# Patient Record
Sex: MALE | Race: WHITE | NOT HISPANIC OR LATINO | Employment: UNEMPLOYED | URBAN - METROPOLITAN AREA
[De-identification: names, ages, dates, MRNs, and addresses within clinical notes are randomized per-mention and may not be internally consistent; named-entity substitution may affect disease eponyms.]

---

## 2019-06-25 ENCOUNTER — TELEPHONE (OUTPATIENT)
Dept: FAMILY MEDICINE | Facility: OTHER | Age: 44
End: 2019-06-25

## 2019-06-25 NOTE — TELEPHONE ENCOUNTER
7:43 AM    Reason for Call: OVERBOOK    Patient is having the following symptoms: URI  for  1 week    The patient is requesting an appointment for Friday  with Mustapha Celeste    Was an appointment offered for this call?  No    Preferred method for responding to this message: [phone]   Wife    If we cannot reach you directly, may we leave a detailed response at the number you provided? Yes        Latia Reese

## 2019-06-27 NOTE — PROGRESS NOTES
"Subjective     Nehemias Miller is a 43 year old male who presents to clinic today for the following health issues:    HPI   Acute Illness   Acute illness concerns: uri  Onset: 2 weeks    Fever: no    Chills/Sweats: YES    Headache (location?): no     Sinus Pressure:YES    Conjunctivitis:  YES-     Ear Pain: no    Rhinorrhea: YES    Congestion: YES    Sore Throat: YES     Cough: YES    Wheeze: YES    Decreased Appetite: YES    Nausea: no     Vomiting: no     Diarrhea:  no     Dysuria/Freq.: no     Fatigue/Achiness: YES    Sick/Strep Exposure: YES- wife     Therapies Tried and outcome: none        There is no problem list on file for this patient.    No past surgical history on file.    Social History     Tobacco Use     Smoking status: Former Smoker     Smokeless tobacco: Current User   Substance Use Topics     Alcohol use: Yes     Comment: occ     No family history on file.      No current outpatient medications on file.     No Known Allergies  Recent Labs   Lab Test 11/24/16  1732   ALT 21   CR 1.26*   GFRESTIMATED 64   GFRESTBLACK 77   POTASSIUM 3.8      BP Readings from Last 3 Encounters:   06/28/19 114/64   11/24/16 (!) 141/108   09/28/16 140/87    Wt Readings from Last 3 Encounters:   06/28/19 78.9 kg (174 lb)   10/01/15 81.6 kg (180 lb)                 Reviewed and updated as needed this visit by Provider         Review of Systems   ROS COMP: Constitutional, HEENT, cardiovascular, pulmonary, gi and gu systems are negative, except as otherwise noted.      Objective    /64 (BP Location: Right arm, Patient Position: Sitting, Cuff Size: Adult Large)   Pulse 78   Temp 98.6  F (37  C) (Tympanic)   Resp 14   Ht 1.778 m (5' 10\")   Wt 78.9 kg (174 lb)   SpO2 93%   BMI 24.97 kg/m    Body mass index is 24.97 kg/m .  Physical Exam   GENERAL: healthy, alert and no distress  HENT: normal cephalic/atraumatic, both ears: dull, nose and mouth without ulcers or lesions, nasal mucosa edematous , rhinorrhea yellow and " "thick, oral mucous membranes moist, throat erythematous and sinuses: maxillary and frontal are tender.    NECK: no adenopathy, no asymmetry, masses, or scars and thyroid normal to palpation  RESP: lungs clear to auscultation - no rales, rhonchi or wheezes  CV: regular rate and rhythm, normal S1 S2, no S3 or S4, no murmur, click or rub, no peripheral edema and peripheral pulses strong  MS: no gross musculoskeletal defects noted, no edema  PSYCH: mentation appears normal, affect normal/bright            Assessment & Plan     1. Acute sinusitis with symptoms greater than 10 days  - amoxicillin-clavulanate (AUGMENTIN) 875-125 MG tablet; Take 1 tablet by mouth 2 times daily for 14 days  Dispense: 28 tablet; Refill: 0  - triamcinolone (NASACORT) 55 MCG/ACT nasal aerosol; Spray 2 sprays into both nostrils daily  Dispense: 1 Bottle; Refill: 0     BMI:   Estimated body mass index is 24.97 kg/m  as calculated from the following:    Height as of this encounter: 1.778 m (5' 10\").    Weight as of this encounter: 78.9 kg (174 lb).         FUTURE APPOINTMENTS:       - Follow-up visit if no improvement or any worsening       Mary Leslie NP  [facility]      "

## 2019-06-28 ENCOUNTER — OFFICE VISIT (OUTPATIENT)
Dept: FAMILY MEDICINE | Facility: OTHER | Age: 44
End: 2019-06-28
Attending: NURSE PRACTITIONER
Payer: COMMERCIAL

## 2019-06-28 VITALS
DIASTOLIC BLOOD PRESSURE: 64 MMHG | RESPIRATION RATE: 14 BRPM | WEIGHT: 174 LBS | SYSTOLIC BLOOD PRESSURE: 114 MMHG | TEMPERATURE: 98.6 F | OXYGEN SATURATION: 93 % | HEIGHT: 70 IN | HEART RATE: 78 BPM | BODY MASS INDEX: 24.91 KG/M2

## 2019-06-28 DIAGNOSIS — J01.90 ACUTE SINUSITIS WITH SYMPTOMS GREATER THAN 10 DAYS: Primary | ICD-10-CM

## 2019-06-28 PROCEDURE — 99213 OFFICE O/P EST LOW 20 MIN: CPT | Performed by: NURSE PRACTITIONER

## 2019-06-28 RX ORDER — TRIAMCINOLONE ACETONIDE 55 UG/1
2 SPRAY, METERED NASAL DAILY
Qty: 1 BOTTLE | Refills: 0 | Status: SHIPPED | OUTPATIENT
Start: 2019-06-28 | End: 2019-12-16

## 2019-06-28 ASSESSMENT — MIFFLIN-ST. JEOR: SCORE: 1700.51

## 2019-06-28 ASSESSMENT — PAIN SCALES - GENERAL: PAINLEVEL: NO PAIN (0)

## 2019-06-28 NOTE — PATIENT INSTRUCTIONS
"  Assessment & Plan     1. Acute sinusitis with symptoms greater than 10 days  - amoxicillin-clavulanate (AUGMENTIN) 875-125 MG tablet; Take 1 tablet by mouth 2 times daily for 14 days  Dispense: 28 tablet; Refill: 0  - triamcinolone (NASACORT) 55 MCG/ACT nasal aerosol; Spray 2 sprays into both nostrils daily  Dispense: 1 Bottle; Refill: 0     BMI:   Estimated body mass index is 24.97 kg/m  as calculated from the following:    Height as of this encounter: 1.778 m (5' 10\").    Weight as of this encounter: 78.9 kg (174 lb).         FUTURE APPOINTMENTS:       - Follow-up visit if no improvement or any worsening       Mary Leslie NP  [facility]      "

## 2019-06-28 NOTE — NURSING NOTE
"Chief Complaint   Patient presents with     URI       Initial /64 (BP Location: Right arm, Patient Position: Sitting, Cuff Size: Adult Large)   Pulse 78   Temp 98.6  F (37  C) (Tympanic)   Resp 14   Ht 1.778 m (5' 10\")   Wt 78.9 kg (174 lb)   SpO2 93%   BMI 24.97 kg/m   Estimated body mass index is 24.97 kg/m  as calculated from the following:    Height as of this encounter: 1.778 m (5' 10\").    Weight as of this encounter: 78.9 kg (174 lb).  Medication Reconciliation: complete   Anabel Salazar      "

## 2019-12-12 ENCOUNTER — TRANSFERRED RECORDS (OUTPATIENT)
Dept: HEALTH INFORMATION MANAGEMENT | Facility: CLINIC | Age: 44
End: 2019-12-12

## 2019-12-16 ENCOUNTER — OFFICE VISIT (OUTPATIENT)
Dept: FAMILY MEDICINE | Facility: OTHER | Age: 44
End: 2019-12-16
Attending: NURSE PRACTITIONER
Payer: COMMERCIAL

## 2019-12-16 VITALS
OXYGEN SATURATION: 96 % | SYSTOLIC BLOOD PRESSURE: 114 MMHG | DIASTOLIC BLOOD PRESSURE: 68 MMHG | BODY MASS INDEX: 24.91 KG/M2 | WEIGHT: 174 LBS | HEART RATE: 86 BPM | HEIGHT: 70 IN | RESPIRATION RATE: 14 BRPM | TEMPERATURE: 97.9 F

## 2019-12-16 DIAGNOSIS — N45.1 EPIDIDYMITIS: ICD-10-CM

## 2019-12-16 DIAGNOSIS — R39.11 URINARY HESITANCY: Primary | ICD-10-CM

## 2019-12-16 PROBLEM — F90.9 ADHD (ATTENTION DEFICIT HYPERACTIVITY DISORDER): Status: ACTIVE | Noted: 2019-12-16

## 2019-12-16 PROBLEM — F17.200 TOBACCO DEPENDENCY: Status: ACTIVE | Noted: 2019-12-16

## 2019-12-16 PROBLEM — F41.0 PANIC ANXIETY SYNDROME: Status: ACTIVE | Noted: 2019-12-16

## 2019-12-16 PROCEDURE — 99213 OFFICE O/P EST LOW 20 MIN: CPT | Performed by: NURSE PRACTITIONER

## 2019-12-16 RX ORDER — NICOTINE 21 MG/24HR
PATCH, TRANSDERMAL 24 HOURS TRANSDERMAL
Refills: 0 | COMMUNITY
Start: 2019-12-12 | End: 2019-12-30

## 2019-12-16 RX ORDER — LEVOFLOXACIN 500 MG/1
TABLET, FILM COATED ORAL
Refills: 0 | COMMUNITY
Start: 2019-12-12 | End: 2019-12-30

## 2019-12-16 RX ORDER — MELATONIN/LEMON BALM LEAF EXTR 5MG-500MCG
TABLET ORAL
Refills: 0 | COMMUNITY
Start: 2019-12-12 | End: 2019-12-30

## 2019-12-16 ASSESSMENT — PAIN SCALES - GENERAL: PAINLEVEL: SEVERE PAIN (7)

## 2019-12-16 ASSESSMENT — MIFFLIN-ST. JEOR: SCORE: 1695.51

## 2019-12-16 NOTE — NURSING NOTE
"Chief Complaint   Patient presents with     ER F/U       Initial /68 (BP Location: Left arm, Patient Position: Sitting, Cuff Size: Adult Large)   Pulse 86   Temp 97.9  F (36.6  C) (Tympanic)   Resp 14   Ht 1.778 m (5' 10\")   Wt 78.9 kg (174 lb)   SpO2 96%   BMI 24.97 kg/m   Estimated body mass index is 24.97 kg/m  as calculated from the following:    Height as of this encounter: 1.778 m (5' 10\").    Weight as of this encounter: 78.9 kg (174 lb).  Medication Reconciliation: complete  Anabel Salazar LPN    "

## 2019-12-16 NOTE — PATIENT INSTRUCTIONS
Assessment & Plan     1. Urinary hesitancy  Continue levaquin  - UROLOGY ADULT REFERRAL    2. Epididymitis  Continue levaquin.    Increase fluids    Declined flu vaccine      No follow-ups on file.    Mary Leslie NP  [facility]

## 2019-12-16 NOTE — PROGRESS NOTES
Subjective     Nehemias Miller is a 44 year old male who presents to clinic today for the following health issues:    HPI   ED/UC Followup:    Facility:  [facility]  Date of visit: 12/13/19  Reason for visit: urinary pain  Current Status: ongoing feeling of a stricture     Notes reviewed from brandan.  He had a UA, chlamydia and gonorrhea tests that were negative.  He was treated for epididymitis with levaquin 500mg daily for 10 days and encouraged to follow-up with PCP.  He did have epididymitis several years ago.      He continues to feel as he has to strain to urinate.  He is on day 4 oc the levaquin.  Denies dysuria, hematuria, penile discharge.   Denies fever, chills or pelvic pain.  Denies flank pain.        Patient Active Problem List   Diagnosis     ADHD (attention deficit hyperactivity disorder)     Alcohol abuse     Asthma     Depressive disorder, not elsewhere classified     Panic anxiety syndrome     Tobacco dependency     No past surgical history on file.    Social History     Tobacco Use     Smoking status: Former Smoker     Smokeless tobacco: Current User   Substance Use Topics     Alcohol use: Yes     Comment: occ     No family history on file.      Current Outpatient Medications   Medication Sig Dispense Refill     levofloxacin (LEVAQUIN) 500 MG tablet   0     nicotine (NICODERM CQ) 14 MG/24HR 24 hr patch   0     nicotine (NICODERM CQ) 21 MG/24HR 24 hr patch   0     NICOTINE STEP 3 7 MG/24HR 24 hr patch   0     No Known Allergies  Recent Labs   Lab Test 11/24/16  1732   ALT 21   CR 1.26*   GFRESTIMATED 64   GFRESTBLACK 77   POTASSIUM 3.8      BP Readings from Last 3 Encounters:   12/16/19 114/68   06/28/19 114/64   11/24/16 (!) 141/108    Wt Readings from Last 3 Encounters:   12/16/19 78.9 kg (174 lb)   06/28/19 78.9 kg (174 lb)   10/01/15 81.6 kg (180 lb)               Reviewed and updated as needed this visit by Provider         Review of Systems   ROS COMP: Constitutional, HEENT,  "cardiovascular, pulmonary, gi and gu systems are negative, except as otherwise noted.      Objective    /68 (BP Location: Left arm, Patient Position: Sitting, Cuff Size: Adult Large)   Pulse 86   Temp 97.9  F (36.6  C) (Tympanic)   Resp 14   Ht 1.778 m (5' 10\")   Wt 78.9 kg (174 lb)   SpO2 96%   BMI 24.97 kg/m    Body mass index is 24.97 kg/m .  Physical Exam   GENERAL: healthy, alert and no distress   (male): deferred to urology.   MS: no gross musculoskeletal defects noted, no edema  PSYCH: mentation appears normal, affect normal/bright            Assessment & Plan     1. Urinary hesitancy  Continue levaquin  - UROLOGY ADULT REFERRAL    2. Epididymitis  Continue levaquin.    Increase fluids    Declined flu vaccine      Follow-up as needed     Mary Leslie, NP  [facility]          "

## 2019-12-19 DIAGNOSIS — R39.11 URINARY HESITANCY: Primary | ICD-10-CM

## 2019-12-30 ENCOUNTER — OFFICE VISIT (OUTPATIENT)
Dept: UROLOGY | Facility: OTHER | Age: 44
End: 2019-12-30
Attending: UROLOGY
Payer: COMMERCIAL

## 2019-12-30 VITALS
SYSTOLIC BLOOD PRESSURE: 122 MMHG | DIASTOLIC BLOOD PRESSURE: 74 MMHG | TEMPERATURE: 96.9 F | OXYGEN SATURATION: 98 % | RESPIRATION RATE: 16 BRPM | HEART RATE: 84 BPM

## 2019-12-30 DIAGNOSIS — R39.11 URINARY HESITANCY: ICD-10-CM

## 2019-12-30 DIAGNOSIS — R39.9 LOWER URINARY TRACT SYMPTOMS (LUTS): Primary | ICD-10-CM

## 2019-12-30 LAB
ALBUMIN UR-MCNC: NEGATIVE MG/DL
APPEARANCE UR: CLEAR
BILIRUB UR QL STRIP: NEGATIVE
COLOR UR AUTO: NORMAL
GLUCOSE UR STRIP-MCNC: NEGATIVE MG/DL
HGB UR QL STRIP: NEGATIVE
KETONES UR STRIP-MCNC: NEGATIVE MG/DL
LEUKOCYTE ESTERASE UR QL STRIP: NEGATIVE
NITRATE UR QL: NEGATIVE
PH UR STRIP: 6 PH (ref 4.7–8)
SOURCE: NORMAL
SP GR UR STRIP: 1 (ref 1–1.03)
UROBILINOGEN UR STRIP-MCNC: NORMAL MG/DL (ref 0–2)

## 2019-12-30 PROCEDURE — 81003 URINALYSIS AUTO W/O SCOPE: CPT | Performed by: UROLOGY

## 2019-12-30 PROCEDURE — 99203 OFFICE O/P NEW LOW 30 MIN: CPT | Performed by: UROLOGY

## 2019-12-30 ASSESSMENT — ENCOUNTER SYMPTOMS
DIFFICULTY URINATING: 1
DIARRHEA: 1

## 2019-12-30 ASSESSMENT — PAIN SCALES - GENERAL: PAINLEVEL: MILD PAIN (3)

## 2019-12-30 NOTE — LETTER
12/30/2019       RE: Nehemias Miller  [address] [place] [address]     Dear Colleague,    Thank you for referring your patient, Nehemias Miller, to the [facility]. Please see a copy of my visit note below.      History     Chief Complaint:    Consult (Per Roula regarding urinary hesitency.)      HPI   Nehemias Miller is a 44 year old male who presents with a recent history of urinary hesitancy.  Kenney said that he is always been a fairly frequent voider but has always voided large volumes of urine because he drinks a lot of fluids.  About 3 weeks ago he started noting some pain at the tip of the penis and he has to push to void and that is been going on for about 3 weeks.  He is voiding every 2 hours and smaller volumes.  He used to get up 5 times at night which I felt was excessive but he thought that that was his normal because again he drinks a large amount of fluids and now he is not getting up at night.  He denies any hematuria or any urinary tract infections.  He was seen in an urgent clinic in [place] and they did a urinalysis which was reported as normal and also they checked him for STDs and that was negative.  He denies any discharge from the penis and again no urinary tract infections.  He was given an antibiotic and that did not make any difference.  He is unaware of any perineal trauma or history of stricture or instrumentation to the penis in the past.  He denies any urgency or incontinence    Allergies:    No Known Allergies     Medications:      No current outpatient medications on file.      Problem List:      Patient Active Problem List    Diagnosis Date Noted     ADHD (attention deficit hyperactivity disorder) 12/16/2019     Priority: Medium     IMO Update       Panic anxiety syndrome 12/16/2019     Priority: Medium     IMO Update 10/11       Tobacco dependency 12/16/2019     Priority: Medium     IMO Update 10/11        Depressive disorder, not elsewhere classified 04/04/2005     Priority: Medium     And anxiety       Alcohol abuse 04/01/2005     Priority: Medium     Alcoholism  IMO Update 10/11       Asthma 06/10/2002     Priority: Medium     IMO Update 10 2016          Past Medical History:      History reviewed. No pertinent past medical history.    Past Surgical History:      Past Surgical History:   Procedure Laterality Date      UROLOGY SURGERY PROCEDURE Left     testical torsion repair     HERNIA REPAIR Left     inguinal       Family History:      Family History   Problem Relation Age of Onset     Cancer Mother      Cancer Maternal Grandmother      Cancer Other        Social History:    Marital Status:  Single [1]  Social History     Tobacco Use     Smoking status: Former Smoker     Smokeless tobacco: Current User   Substance Use Topics     Alcohol use: Yes     Comment: occ     Drug use: No        Review of Systems   Gastrointestinal: Positive for diarrhea.   Genitourinary: Positive for difficulty urinating.   All other systems reviewed and are negative.    Postvoid residual greater than 191 mL    AUASS 24    Physical Exam   Vitals:  /74   Pulse 84   Temp 96.9  F (36.1  C) (Tympanic)   Resp 16   SpO2 98%       Physical Exam  Constitutional:       Appearance: Normal appearance. He is normal weight.   Pulmonary:      Effort: Pulmonary effort is normal.   Abdominal:      General: Abdomen is flat. Bowel sounds are normal.      Palpations: Abdomen is soft. There is no mass.      Hernia: No hernia is present.   Genitourinary:     Penis: Normal and circumcised.       Scrotum/Testes:         Right: Mass, tenderness or swelling not present.         Left: Tenderness and swelling present. Mass not present.      Epididymis:      Right: Normal.      Left: Normal.      Prostate: Normal.      Rectum: Normal.      Comments: On examination of the penis.  The meatus is wide open no evidence of any stricture or lichen sclerosus.   Palpating the ventral surface of the penis on the urethra there is no mass or lump or bump or discomfort.  External rectal area is normal normal rectal tone and no rectal masses.  His prostate is small around 10 g.  Neurological:      Mental Status: He is alert.     Impression: Urinary hesitancy, incomplete bladder emptying      Plan   Plan: Nehemias is not emptying his bladder and has had a sudden onset of hesitancy so my first concern is to rule out a urethral stricture.  Recommendation is to return for cystoscopy.  He has a urinalysis pending and as long as that is negative we will proceed with cystoscopy on Thursday.  I have gone over the procedure with him and the risks of bleeding and infection.  He will return on Thursday for further evaluation.      No follow-ups on file.    Trinidad Chan MD  [facility] [place]            Again, thank you for allowing me to participate in the care of your patient.      Sincerely,    Trinidad Chan MD

## 2019-12-30 NOTE — PROGRESS NOTES
History     Chief Complaint:    Consult (Per Roula regarding urinary hesitency.)      HPI   Nehemias Miller is a 44 year old male who presents with a recent history of urinary hesitancy.  Kenney said that he is always been a fairly frequent voider but has always voided large volumes of urine because he drinks a lot of fluids.  About 3 weeks ago he started noting some pain at the tip of the penis and he has to push to void and that is been going on for about 3 weeks.  He is voiding every 2 hours and smaller volumes.  He used to get up 5 times at night which I felt was excessive but he thought that that was his normal because again he drinks a large amount of fluids and now he is not getting up at night.  He denies any hematuria or any urinary tract infections.  He was seen in an urgent clinic in [place] and they did a urinalysis which was reported as normal and also they checked him for STDs and that was negative.  He denies any discharge from the penis and again no urinary tract infections.  He was given an antibiotic and that did not make any difference.  He is unaware of any perineal trauma or history of stricture or instrumentation to the penis in the past.  He denies any urgency or incontinence    Allergies:    No Known Allergies     Medications:      No current outpatient medications on file.      Problem List:      Patient Active Problem List    Diagnosis Date Noted     ADHD (attention deficit hyperactivity disorder) 12/16/2019     Priority: Medium     IMO Update       Panic anxiety syndrome 12/16/2019     Priority: Medium     IMO Update 10/11       Tobacco dependency 12/16/2019     Priority: Medium     IMO Update 10/11       Depressive disorder, not elsewhere classified 04/04/2005     Priority: Medium     And anxiety       Alcohol abuse 04/01/2005     Priority: Medium     Alcoholism  IMO Update 10/11       Asthma 06/10/2002     Priority: Medium     IMO Update 10 2016          Past Medical History:      History  reviewed. No pertinent past medical history.    Past Surgical History:      Past Surgical History:   Procedure Laterality Date      UROLOGY SURGERY PROCEDURE Left     testical torsion repair     HERNIA REPAIR Left     inguinal       Family History:      Family History   Problem Relation Age of Onset     Cancer Mother      Cancer Maternal Grandmother      Cancer Other        Social History:    Marital Status:  Single [1]  Social History     Tobacco Use     Smoking status: Former Smoker     Smokeless tobacco: Current User   Substance Use Topics     Alcohol use: Yes     Comment: occ     Drug use: No        Review of Systems   Gastrointestinal: Positive for diarrhea.   Genitourinary: Positive for difficulty urinating.   All other systems reviewed and are negative.    Postvoid residual greater than 191 mL    AUASS 24    Physical Exam   Vitals:  /74   Pulse 84   Temp 96.9  F (36.1  C) (Tympanic)   Resp 16   SpO2 98%       Physical Exam  Constitutional:       Appearance: Normal appearance. He is normal weight.   Pulmonary:      Effort: Pulmonary effort is normal.   Abdominal:      General: Abdomen is flat. Bowel sounds are normal.      Palpations: Abdomen is soft. There is no mass.      Hernia: No hernia is present.   Genitourinary:     Penis: Normal and circumcised.       Scrotum/Testes:         Right: Mass, tenderness or swelling not present.         Left: Tenderness and swelling present. Mass not present.      Epididymis:      Right: Normal.      Left: Normal.      Prostate: Normal.      Rectum: Normal.      Comments: On examination of the penis.  The meatus is wide open no evidence of any stricture or lichen sclerosus.  Palpating the ventral surface of the penis on the urethra there is no mass or lump or bump or discomfort.  External rectal area is normal normal rectal tone and no rectal masses.  His prostate is small around 10 g.  Neurological:      Mental Status: He is alert.     Impression: Urinary  hesitancy, incomplete bladder emptying      Plan   Plan: Nehemias is not emptying his bladder and has had a sudden onset of hesitancy so my first concern is to rule out a urethral stricture.  Recommendation is to return for cystoscopy.  He has a urinalysis pending and as long as that is negative we will proceed with cystoscopy on Thursday.  I have gone over the procedure with him and the risks of bleeding and infection.  He will return on Thursday for further evaluation.      No follow-ups on file.    Trinidad Chan MD  [facility] [place]

## 2019-12-30 NOTE — NURSING NOTE
"Chief Complaint   Patient presents with     Consult     Per Roula regarding urinary hesitency.       Initial /74   Pulse 84   Temp 96.9  F (36.1  C) (Tympanic)   Resp 16   SpO2 98%  Estimated body mass index is 24.97 kg/m  as calculated from the following:    Height as of 12/16/19: 1.778 m (5' 10\").    Weight as of 12/16/19: 78.9 kg (174 lb).  Medication Reconciliation: complete   Review of Systems:    Weight loss:    No     Recent fever/chills:  No   Night sweats:   yes  Current skin rash:  No   Recent hair loss:  No  Heat intolerance:  yes   Cold intolerance:  No  Chest pain:   No   Palpitations:   No  Shortness of breath:  yes   Wheezing:   No  Constipation:    yes   Diarrhea:   yes   Nausea:   No   Vomiting:   No   Kidney/side pain:  yes   Back pain:   yes  Frequent headaches:  No   Dizziness:     No  Leg swelling:   No   Calf pain:    No    Parents, brothers or sisters with history of kidney cancer:   No  Parents, brothers or sisters with history of bladder cancer: No      Ginny John LPN    "

## 2020-01-02 ENCOUNTER — OFFICE VISIT (OUTPATIENT)
Dept: UROLOGY | Facility: OTHER | Age: 45
End: 2020-01-02
Attending: UROLOGY
Payer: COMMERCIAL

## 2020-01-02 VITALS
WEIGHT: 175 LBS | SYSTOLIC BLOOD PRESSURE: 132 MMHG | DIASTOLIC BLOOD PRESSURE: 82 MMHG | OXYGEN SATURATION: 94 % | BODY MASS INDEX: 25.11 KG/M2 | TEMPERATURE: 98.1 F | HEART RATE: 88 BPM

## 2020-01-02 DIAGNOSIS — R39.9 LOWER URINARY TRACT SYMPTOMS (LUTS): Primary | ICD-10-CM

## 2020-01-02 PROCEDURE — 52000 CYSTOURETHROSCOPY: CPT | Performed by: UROLOGY

## 2020-01-02 PROCEDURE — 99213 OFFICE O/P EST LOW 20 MIN: CPT | Mod: 25 | Performed by: UROLOGY

## 2020-01-02 ASSESSMENT — PAIN SCALES - GENERAL: PAINLEVEL: NO PAIN (0)

## 2020-01-02 NOTE — NURSING NOTE
"Chief Complaint   Patient presents with     Cystoscopy       Review of Systems:    Weight loss:    No     Recent fever/chills:  No   Night sweats:   Yes  Current skin rash:  No   Recent hair loss:  No  Heat intolerance:  Yes   Cold intolerance:  No  Chest pain:   No   Palpitations:   No  Shortness of breath:  No   Wheezing:   No  Constipation:    No   Diarrhea:   Yes   Nausea:   No   Vomiting:   No   Kidney/side pain:  Yes   Back pain:   Yes  Frequent headaches:  No   Dizziness:     Yes  Leg swelling:   No   Calf pain:    No    Parents, brothers or sisters with history of kidney cancer:   No  Parents, brothers or sisters with history of bladder cancer: No      Initial /82   Pulse 88   Temp 98.1  F (36.7  C)   Wt 79.4 kg (175 lb)   SpO2 94%   BMI 25.11 kg/m   Estimated body mass index is 25.11 kg/m  as calculated from the following:    Height as of 12/16/19: 1.778 m (5' 10\").    Weight as of this encounter: 79.4 kg (175 lb).  Medication Reconciliation: complete  Jessa Behrman, LPN  "

## 2020-01-02 NOTE — LETTER
1/2/2020       RE: Nehemias Miller  [address] [place] [address]     Dear Colleague,    Thank you for referring your patient, Nehemias Miller, to the [facility]. Please see a copy of my visit note below.      History     Chief Complaint:    Cystoscopy      HPI   Nehemias Miller is a 44 year old male who presents with further evaluation of his urinary hesitancy.  As noted in his initial visit Kenney had a sudden onset of urinary hesitancy incomplete bladder emptying so he is here for cystoscopy for further evaluation to rule out a stricture.    Allergies:    No Known Allergies     Medications:      No current outpatient medications on file.      Problem List:      Patient Active Problem List    Diagnosis Date Noted     ADHD (attention deficit hyperactivity disorder) 12/16/2019     Priority: Medium     IMO Update       Panic anxiety syndrome 12/16/2019     Priority: Medium     IMO Update 10/11       Tobacco dependency 12/16/2019     Priority: Medium     IMO Update 10/11       Depressive disorder, not elsewhere classified 04/04/2005     Priority: Medium     And anxiety       Alcohol abuse 04/01/2005     Priority: Medium     Alcoholism  IMO Update 10/11       Asthma 06/10/2002     Priority: Medium     IMO Update 10 2016          Past Medical History:      History reviewed. No pertinent past medical history.    Past Surgical History:      Past Surgical History:   Procedure Laterality Date     [facility] UROLOGY SURGERY PROCEDURE Left     testical torsion repair     HERNIA REPAIR Left     inguinal       Family History:      Family History   Problem Relation Age of Onset     Cancer Mother      Cancer Maternal Grandmother      Cancer Other        Social History:    Marital Status:  Single [1]  Social History     Tobacco Use     Smoking status: Current Every Day Smoker     Smokeless tobacco: Current User   Substance Use Topics     Alcohol use: Yes     Comment: occ      Drug use: No        Review of Systems      Physical Exam   Vitals:  /82   Pulse 88   Temp 98.1  F (36.7  C)   Wt 79.4 kg (175 lb)   SpO2 94%   BMI 25.11 kg/m         Physical Exam  Genitourinary:     Penis: Normal and circumcised.       Scrotum/Testes: Normal.     Cystoscopy: Patient was prepped and draped sterilely lidocaine jelly was injected into the urethra.  Patient had previously been counseled on the risks of bleeding and infection at his last visit.  He agrees to proceed.  A flexible cystoscope was inserted into the urethra the anterior and bulbar urethra are normal.  The prostate is small and there is no prostatic obstruction.  Once inside the bladder the entire bladder wall is cystoscoped there are no trabeculations erythematous lesions bladder tumors or stones when you retroflexed the scope the bladder neck is normal.  Trigone appeared normal.    Impression: Urinary hesitancy with incomplete bladder emptying    Plan   Plan: 15 minutes excluding procedure time was spent discussing etiology and possible treatment options.  No anatomical obstruction was noted on evaluation today.  This is likely a physiologic or possibly a neurologic issue.  I gave him several options.  #1 We could just observe for several months to see if things get better since he is really not having any harm from the symptoms.  #2 we could consider some tamsulosin to help relax the bladder neck and prostate smooth muscle and this might improve his hesitancy but he has had some recent episodes of dizziness so I did discuss dizziness and nasal stuffiness as some potential side effects of that #3 proceed with neurologic evaluation.  The patient has elected to proceed with option 1 and I will see him back in 2 months for follow-up and a bladder scan.      Return in about 2 months (around 3/2/2020).    Trinidad Chan MD  [facility] [place]            Again, thank you for allowing me to participate in the care of  your patient.      Sincerely,    Trinidad Chan MD

## 2020-01-02 NOTE — PROGRESS NOTES
History     Chief Complaint:    Cystoscopy      HPI   Nehemias Miller is a 44 year old male who presents with further evaluation of his urinary hesitancy.  As noted in his initial visit Kenney had a sudden onset of urinary hesitancy incomplete bladder emptying so he is here for cystoscopy for further evaluation to rule out a stricture.    Allergies:    No Known Allergies     Medications:      No current outpatient medications on file.      Problem List:      Patient Active Problem List    Diagnosis Date Noted     ADHD (attention deficit hyperactivity disorder) 12/16/2019     Priority: Medium     IMO Update       Panic anxiety syndrome 12/16/2019     Priority: Medium     IMO Update 10/11       Tobacco dependency 12/16/2019     Priority: Medium     IMO Update 10/11       Depressive disorder, not elsewhere classified 04/04/2005     Priority: Medium     And anxiety       Alcohol abuse 04/01/2005     Priority: Medium     Alcoholism  IMO Update 10/11       Asthma 06/10/2002     Priority: Medium     IMO Update 10 2016          Past Medical History:      History reviewed. No pertinent past medical history.    Past Surgical History:      Past Surgical History:   Procedure Laterality Date     [facility] UROLOGY SURGERY PROCEDURE Left     testical torsion repair     HERNIA REPAIR Left     inguinal       Family History:      Family History   Problem Relation Age of Onset     Cancer Mother      Cancer Maternal Grandmother      Cancer Other        Social History:    Marital Status:  Single [1]  Social History     Tobacco Use     Smoking status: Current Every Day Smoker     Smokeless tobacco: Current User   Substance Use Topics     Alcohol use: Yes     Comment: occ     Drug use: No        Review of Systems      Physical Exam   Vitals:  /82   Pulse 88   Temp 98.1  F (36.7  C)   Wt 79.4 kg (175 lb)   SpO2 94%   BMI 25.11 kg/m        Physical Exam  Genitourinary:     Penis: Normal and circumcised.       Scrotum/Testes: Normal.      Cystoscopy: Patient was prepped and draped sterilely lidocaine jelly was injected into the urethra.  Patient had previously been counseled on the risks of bleeding and infection at his last visit.  He agrees to proceed.  A flexible cystoscope was inserted into the urethra the anterior and bulbar urethra are normal.  The prostate is small and there is no prostatic obstruction.  Once inside the bladder the entire bladder wall is cystoscoped there are no trabeculations erythematous lesions bladder tumors or stones when you retroflexed the scope the bladder neck is normal.  Trigone appeared normal.    Impression: Urinary hesitancy with incomplete bladder emptying    Plan   Plan: 15 minutes excluding procedure time was spent discussing etiology and possible treatment options.  No anatomical obstruction was noted on evaluation today.  This is likely a physiologic or possibly a neurologic issue.  I gave him several options.  #1 We could just observe for several months to see if things get better since he is really not having any harm from the symptoms.  #2 we could consider some tamsulosin to help relax the bladder neck and prostate smooth muscle and this might improve his hesitancy but he has had some recent episodes of dizziness so I did discuss dizziness and nasal stuffiness as some potential side effects of that #3 proceed with neurologic evaluation.  The patient has elected to proceed with option 1 and I will see him back in 2 months for follow-up and a bladder scan.      Return in about 2 months (around 3/2/2020).    Trinidad Chan MD  [facility]

## 2020-04-23 ENCOUNTER — HOSPITAL ENCOUNTER (EMERGENCY)
Facility: HOSPITAL | Age: 45
Discharge: HOME | End: 2020-04-24
Attending: EMERGENCY MEDICINE | Admitting: EMERGENCY MEDICINE
Payer: COMMERCIAL

## 2020-04-23 DIAGNOSIS — J18.9 PNEUMONIA OF RIGHT MIDDLE LOBE DUE TO INFECTIOUS ORGANISM: ICD-10-CM

## 2020-04-23 LAB
BASOPHILS # BLD AUTO: 0.1 10E9/L (ref 0–0.2)
BASOPHILS NFR BLD AUTO: 0.5 %
DIFFERENTIAL METHOD BLD: ABNORMAL
EOSINOPHIL # BLD AUTO: 0.1 10E9/L (ref 0–0.7)
EOSINOPHIL NFR BLD AUTO: 0.3 %
ERYTHROCYTE [DISTWIDTH] IN BLOOD BY AUTOMATED COUNT: 11.8 % (ref 10–15)
HCT VFR BLD AUTO: 43.6 % (ref 40–53)
HGB BLD-MCNC: 15.6 G/DL (ref 13.3–17.7)
IMM GRANULOCYTES # BLD: 0.1 10E9/L (ref 0–0.4)
IMM GRANULOCYTES NFR BLD: 0.7 %
LYMPHOCYTES # BLD AUTO: 1 10E9/L (ref 0.8–5.3)
LYMPHOCYTES NFR BLD AUTO: 5.6 %
MCH RBC QN AUTO: 32.2 PG (ref 26.5–33)
MCHC RBC AUTO-ENTMCNC: 35.8 G/DL (ref 31.5–36.5)
MCV RBC AUTO: 90 FL (ref 78–100)
MONOCYTES # BLD AUTO: 0.6 10E9/L (ref 0–1.3)
MONOCYTES NFR BLD AUTO: 3.5 %
NEUTROPHILS # BLD AUTO: 15.2 10E9/L (ref 1.6–8.3)
NEUTROPHILS NFR BLD AUTO: 89.4 %
NRBC # BLD AUTO: 0 10*3/UL
NRBC BLD AUTO-RTO: 0 /100
PLATELET # BLD AUTO: 219 10E9/L (ref 150–450)
RBC # BLD AUTO: 4.85 10E12/L (ref 4.4–5.9)
WBC # BLD AUTO: 17 10E9/L (ref 4–11)

## 2020-04-23 PROCEDURE — 36415 COLL VENOUS BLD VENIPUNCTURE: CPT | Performed by: EMERGENCY MEDICINE

## 2020-04-23 PROCEDURE — 25800030 ZZH RX IP 258 OP 636: Performed by: EMERGENCY MEDICINE

## 2020-04-23 PROCEDURE — 99283 EMERGENCY DEPT VISIT LOW MDM: CPT | Mod: Z6 | Performed by: EMERGENCY MEDICINE

## 2020-04-23 PROCEDURE — 84484 ASSAY OF TROPONIN QUANT: CPT | Performed by: EMERGENCY MEDICINE

## 2020-04-23 PROCEDURE — 80053 COMPREHEN METABOLIC PANEL: CPT | Performed by: EMERGENCY MEDICINE

## 2020-04-23 PROCEDURE — 85025 COMPLETE CBC W/AUTO DIFF WBC: CPT | Performed by: EMERGENCY MEDICINE

## 2020-04-23 PROCEDURE — 96361 HYDRATE IV INFUSION ADD-ON: CPT

## 2020-04-23 PROCEDURE — 83605 ASSAY OF LACTIC ACID: CPT | Performed by: EMERGENCY MEDICINE

## 2020-04-23 PROCEDURE — 96365 THER/PROPH/DIAG IV INF INIT: CPT

## 2020-04-23 PROCEDURE — 99284 EMERGENCY DEPT VISIT MOD MDM: CPT | Mod: 25

## 2020-04-23 PROCEDURE — 85379 FIBRIN DEGRADATION QUANT: CPT | Performed by: EMERGENCY MEDICINE

## 2020-04-23 PROCEDURE — 25000132 ZZH RX MED GY IP 250 OP 250 PS 637: Performed by: EMERGENCY MEDICINE

## 2020-04-23 RX ORDER — ACETAMINOPHEN 325 MG/1
975 TABLET ORAL ONCE
Status: COMPLETED | OUTPATIENT
Start: 2020-04-23 | End: 2020-04-23

## 2020-04-23 RX ORDER — SODIUM CHLORIDE 9 MG/ML
1000 INJECTION, SOLUTION INTRAVENOUS CONTINUOUS
Status: DISCONTINUED | OUTPATIENT
Start: 2020-04-24 | End: 2020-04-24 | Stop reason: HOSPADM

## 2020-04-23 RX ADMIN — ACETAMINOPHEN 975 MG: 325 TABLET, FILM COATED ORAL at 23:46

## 2020-04-23 RX ADMIN — SODIUM CHLORIDE 1000 ML: 9 INJECTION, SOLUTION INTRAVENOUS at 23:58

## 2020-04-23 ASSESSMENT — ENCOUNTER SYMPTOMS
HEMATOLOGIC/LYMPHATIC NEGATIVE: 1
MYALGIAS: 0
CHILLS: 0
EYES NEGATIVE: 1
RESPIRATORY NEGATIVE: 1
NERVOUS/ANXIOUS: 0
SHORTNESS OF BREATH: 0
FEVER: 0
MUSCULOSKELETAL NEGATIVE: 1
SLEEP DISTURBANCE: 0
CONSTITUTIONAL NEGATIVE: 1
PSYCHIATRIC NEGATIVE: 1
GASTROINTESTINAL NEGATIVE: 1
NECK PAIN: 0
CARDIOVASCULAR NEGATIVE: 1
NECK STIFFNESS: 0
ALLERGIC/IMMUNOLOGIC NEGATIVE: 1
NEUROLOGICAL NEGATIVE: 1
ENDOCRINE NEGATIVE: 1

## 2020-04-23 NOTE — ED AVS SNAPSHOT
[facility]  [address] [address]  Phone:  [phone]                                    Nehemias Miller   MRN: [id number]    Department:  [facility]   Date of Visit:  4/23/2020           After Visit Summary Signature Page    I have received my discharge instructions, and my questions have been answered. I have discussed any challenges I see with this plan with the nurse or doctor.    ..........................................................................................................................................  Patient/Patient Representative Signature      ..........................................................................................................................................  Patient Representative Print Name and Relationship to Patient    ..................................................               ................................................  Date                                   Time    ..........................................................................................................................................  Reviewed by Signature/Title    ...................................................              ..............................................  Date                                               Time          22EPIC Rev 08/18

## 2020-04-24 ENCOUNTER — APPOINTMENT (OUTPATIENT)
Dept: CT IMAGING | Facility: HOSPITAL | Age: 45
End: 2020-04-24
Attending: EMERGENCY MEDICINE
Payer: COMMERCIAL

## 2020-04-24 ENCOUNTER — APPOINTMENT (OUTPATIENT)
Dept: GENERAL RADIOLOGY | Facility: HOSPITAL | Age: 45
End: 2020-04-24
Attending: EMERGENCY MEDICINE
Payer: COMMERCIAL

## 2020-04-24 VITALS
OXYGEN SATURATION: 94 % | TEMPERATURE: 99.7 F | BODY MASS INDEX: 25.83 KG/M2 | RESPIRATION RATE: 20 BRPM | WEIGHT: 180 LBS | DIASTOLIC BLOOD PRESSURE: 68 MMHG | HEART RATE: 104 BPM | SYSTOLIC BLOOD PRESSURE: 124 MMHG

## 2020-04-24 LAB
ALBUMIN SERPL-MCNC: 3.6 G/DL (ref 3.4–5)
ALP SERPL-CCNC: 49 U/L (ref 40–150)
ALT SERPL W P-5'-P-CCNC: 32 U/L (ref 0–70)
ANION GAP SERPL CALCULATED.3IONS-SCNC: 7 MMOL/L (ref 3–14)
AST SERPL W P-5'-P-CCNC: 28 U/L (ref 0–45)
BILIRUB SERPL-MCNC: 0.7 MG/DL (ref 0.2–1.3)
BUN SERPL-MCNC: 8 MG/DL (ref 7–30)
CALCIUM SERPL-MCNC: 8.1 MG/DL (ref 8.5–10.1)
CHLORIDE SERPL-SCNC: 103 MMOL/L (ref 94–109)
CO2 SERPL-SCNC: 25 MMOL/L (ref 20–32)
CREAT SERPL-MCNC: 0.93 MG/DL (ref 0.66–1.25)
D DIMER PPP FEU-MCNC: 0.6 UG/ML FEU (ref 0–0.5)
FLUAV+FLUBV RNA SPEC QL NAA+PROBE: NEGATIVE
FLUAV+FLUBV RNA SPEC QL NAA+PROBE: NEGATIVE
GFR SERPL CREATININE-BSD FRML MDRD: >90 ML/MIN/{1.73_M2}
GLUCOSE SERPL-MCNC: 109 MG/DL (ref 70–99)
LACTATE BLD-SCNC: 1 MMOL/L (ref 0.7–2)
POTASSIUM SERPL-SCNC: 3.8 MMOL/L (ref 3.4–5.3)
PROT SERPL-MCNC: 6.8 G/DL (ref 6.8–8.8)
RSV RNA SPEC NAA+PROBE: NEGATIVE
SARS-COV-2 PCR COMMENT: NORMAL
SARS-COV-2 RNA SPEC QL NAA+PROBE: NEGATIVE
SARS-COV-2 RNA SPEC QL NAA+PROBE: NORMAL
SODIUM SERPL-SCNC: 135 MMOL/L (ref 133–144)
SPECIMEN SOURCE: NORMAL
TROPONIN I SERPL-MCNC: <0.015 UG/L (ref 0–0.04)

## 2020-04-24 PROCEDURE — 71045 X-RAY EXAM CHEST 1 VIEW: CPT | Mod: TC

## 2020-04-24 PROCEDURE — 25000132 ZZH RX MED GY IP 250 OP 250 PS 637: Performed by: EMERGENCY MEDICINE

## 2020-04-24 PROCEDURE — 87631 RESP VIRUS 3-5 TARGETS: CPT | Performed by: EMERGENCY MEDICINE

## 2020-04-24 PROCEDURE — 25800030 ZZH RX IP 258 OP 636: Performed by: EMERGENCY MEDICINE

## 2020-04-24 PROCEDURE — 25000128 H RX IP 250 OP 636: Performed by: EMERGENCY MEDICINE

## 2020-04-24 PROCEDURE — 25500064 ZZH RX 255 OP 636: Performed by: EMERGENCY MEDICINE

## 2020-04-24 PROCEDURE — 71275 CT ANGIOGRAPHY CHEST: CPT | Mod: TC

## 2020-04-24 PROCEDURE — 87635 SARS-COV-2 COVID-19 AMP PRB: CPT | Performed by: EMERGENCY MEDICINE

## 2020-04-24 RX ORDER — AZITHROMYCIN 250 MG/1
500 TABLET, FILM COATED ORAL ONCE
Status: COMPLETED | OUTPATIENT
Start: 2020-04-24 | End: 2020-04-24

## 2020-04-24 RX ORDER — AZITHROMYCIN 250 MG/1
250 TABLET, FILM COATED ORAL DAILY
Qty: 4 TABLET | Refills: 0 | Status: SHIPPED | OUTPATIENT
Start: 2020-04-25 | End: 2020-04-29

## 2020-04-24 RX ORDER — IBUPROFEN 600 MG/1
600 TABLET, FILM COATED ORAL ONCE
Status: COMPLETED | OUTPATIENT
Start: 2020-04-24 | End: 2020-04-24

## 2020-04-24 RX ORDER — AZITHROMYCIN 250 MG/1
250 TABLET, FILM COATED ORAL DAILY
Status: DISCONTINUED | OUTPATIENT
Start: 2020-04-25 | End: 2020-04-24

## 2020-04-24 RX ORDER — AMOXICILLIN 875 MG
875 TABLET ORAL 2 TIMES DAILY
Qty: 20 TABLET | Refills: 0 | Status: SHIPPED | OUTPATIENT
Start: 2020-04-25 | End: 2020-05-05

## 2020-04-24 RX ORDER — CEFTRIAXONE SODIUM 1 G/50ML
1 INJECTION, SOLUTION INTRAVENOUS ONCE
Status: COMPLETED | OUTPATIENT
Start: 2020-04-24 | End: 2020-04-24

## 2020-04-24 RX ADMIN — AZITHROMYCIN 500 MG: 250 TABLET, FILM COATED ORAL at 04:51

## 2020-04-24 RX ADMIN — IBUPROFEN 600 MG: 600 TABLET ORAL at 01:21

## 2020-04-24 RX ADMIN — IOHEXOL 75 ML: 350 INJECTION, SOLUTION INTRAVENOUS at 02:15

## 2020-04-24 RX ADMIN — SODIUM CHLORIDE 1000 ML: 9 INJECTION, SOLUTION INTRAVENOUS at 01:03

## 2020-04-24 RX ADMIN — CEFTRIAXONE SODIUM 1 G: 1 INJECTION, SOLUTION INTRAVENOUS at 01:56

## 2020-04-24 NOTE — ED TRIAGE NOTES
"\"Here for having right lung pain and a fever.  Woke up this evening with the lung pain.  Has not taken anything for the fever.\"   "

## 2020-04-24 NOTE — ED NOTES
Condition stable, understands discharge instructions, prescription x 2 e-scribed to Pharmacy of choice,discharged home.

## 2020-04-24 NOTE — DISCHARGE INSTRUCTIONS
1) Follow the aftercare instructions provided.  2) You have been given a prescription for a medication that can cause an allergic reactions. Return to the ER if you develop any itching, tongue swelling, wheezing or shortness of breath.  3) You must follow up with your doctor in 12 to 24 hours.         What to expect when you have contrast    During your exam, we will inject  contrast  into your vein or artery. (Contrast is a clear liquid with iodine in it. It shows up on X-rays.)    You may feel warm or hot. You may have a metal taste in your mouth and a slight upset stomach. You may also feel pressure near the kidneys and bladder. These effects will last about 1 to 3 minutes.    Please tell us if you have:   Sneezing    Itching   Hives    Swelling in the face   A hoarse voice   Breathing problems   Other new symptoms    Serious problems are rare.  They may include:   Irregular heartbeat    Seizures   Kidney failure             Tissue damage   Shock     Death    If you have any problems during the exam, we  will treat them right away.    When you get home    Call your hospital if you have any new symptoms in the next 2 days, like hives or swelling. (Phone numbers are at the bottom of this page.) Or call your family doctor.     If you have wheezing or trouble breathing, call 911.    Self-care  -Drink at least 4 extra glasses of water today.   This reduces the stress on your kidneys.  -Keep taking your regular medicines.    The contrast will pass out of your body in your  Urine(pee). This will happen in the next 24 hours. You  will not feel this. Your urine will not  change color.    If you have kidney problems or take metformin    Drink 4 to 8 large glasses of water for the next  2 days, if you are not on a fluid restriction.      I have read and understand the above information.    Patient Sign Here:______________________________________Date:________Time:______    Staff Sign  Here:________________________________________Date:_______Time:______      Radiology Departments:     ?[facility]: [phone] ?[place]: [phone]     ?[place]: [phone] ?[facility]:[phone]      ?[place]: [phone]  ?[place]: [phone]  ?[place]:[phone]    ?[facility]:[phone]  ?[facility]:[phone]

## 2020-04-24 NOTE — ED NOTES
This writer spoke to patient on the phone regarding questions about taking his prescriptions. Educated patient on Amoxicillin and Azithromycin administration.

## 2020-04-24 NOTE — ED PROVIDER NOTES
History     Chief Complaint   Patient presents with     Fever     Right lung pain     HPI  Nehemias Miller is a 44 year old male who presents today with complaints of fever and right lung pain.  Patient states that symptoms began this evening.  Patient states he woke up feeling well but gradually developed a fever and difficulty breathing.  Pain is noticeable right lower side.  Patient denies any recent trauma.  Patient has been in usual state of health no additional complaints.    Allergies:  No Known Allergies    Problem List:    Patient Active Problem List    Diagnosis Date Noted     ADHD (attention deficit hyperactivity disorder) 12/16/2019     Priority: Medium     IMO Update       Panic anxiety syndrome 12/16/2019     Priority: Medium     IMO Update 10/11       Tobacco dependency 12/16/2019     Priority: Medium     IMO Update 10/11       Depressive disorder, not elsewhere classified 04/04/2005     Priority: Medium     And anxiety       Alcohol abuse 04/01/2005     Priority: Medium     Alcoholism  IMO Update 10/11       Asthma 06/10/2002     Priority: Medium     IMO Update 10 2016          Past Medical History:    No past medical history on file.    Past Surgical History:    Past Surgical History:   Procedure Laterality Date      UROLOGY SURGERY PROCEDURE Left     testical torsion repair     HERNIA REPAIR Left     inguinal       Family History:    Family History   Problem Relation Age of Onset     Cancer Mother      Cancer Maternal Grandmother      Cancer Other        Social History:  Marital Status:  Single [1]  Social History     Tobacco Use     Smoking status: Current Every Day Smoker     Smokeless tobacco: Current User   Substance Use Topics     Alcohol use: Yes     Comment: occ     Drug use: No        Medications:    No current outpatient medications on file.        Review of Systems   Constitutional: Negative.  Negative for chills and fever.   HENT: Negative.    Eyes: Negative.    Respiratory: Negative.   Negative for shortness of breath.    Cardiovascular: Negative.  Negative for chest pain.   Gastrointestinal: Negative.    Endocrine: Negative.    Genitourinary: Negative.    Musculoskeletal: Negative.  Negative for myalgias, neck pain and neck stiffness.   Skin: Negative.    Allergic/Immunologic: Negative.    Neurological: Negative.    Hematological: Negative.    Psychiatric/Behavioral: Negative.  Negative for self-injury, sleep disturbance and suicidal ideas. The patient is not nervous/anxious.        Physical Exam   BP: 135/77  Pulse: 118  Temp: (!) 102.2  F (39  C)  Resp: 20  Weight: 81.6 kg (180 lb)  SpO2: 95 %      Physical Exam  Constitutional:       General: He is not in acute distress.     Appearance: He is normal weight. He is ill-appearing. He is not toxic-appearing.   HENT:      Head: Normocephalic and atraumatic.   Eyes:      Extraocular Movements: Extraocular movements intact.      Conjunctiva/sclera: Conjunctivae normal.   Neck:      Musculoskeletal: Normal range of motion and neck supple. No neck rigidity.   Cardiovascular:      Rate and Rhythm: Tachycardia present.      Pulses: Normal pulses.   Pulmonary:      Effort: Pulmonary effort is normal.      Breath sounds: Normal breath sounds.   Abdominal:      General: Abdomen is flat. There is no distension.      Tenderness: There is no abdominal tenderness. There is no right CVA tenderness, left CVA tenderness or guarding.   Musculoskeletal: Normal range of motion.   Lymphadenopathy:      Cervical: No cervical adenopathy.   Skin:     General: Skin is warm.   Neurological:      General: No focal deficit present.      Mental Status: He is alert.   Psychiatric:         Mood and Affect: Mood normal.         ED Course        Procedures          CXR - Right middle lobe pneumonia  CTA chest - no pe. RUL pneumonia noted.          Results for orders placed or performed during the hospital encounter of 04/23/20 (from the past 24 hour(s))   Comprehensive metabolic  panel   Result Value Ref [place]    Sodium 135 133 - 144 mmol/L    Potassium 3.8 3.4 - 5.3 mmol/L    Chloride 103 94 - 109 mmol/L    Carbon Dioxide 25 20 - 32 mmol/L    Anion Gap 7 3 - 14 mmol/L    Glucose 109 (H) 70 - 99 mg/dL    Urea Nitrogen 8 7 - 30 mg/dL    Creatinine 0.93 0.66 - 1.25 mg/dL    GFR Estimate >90 >60 mL/min/[1.73_m2]    GFR Estimate If Black >90 >60 mL/min/[1.73_m2]    Calcium 8.1 (L) 8.5 - 10.1 mg/dL    Bilirubin Total 0.7 0.2 - 1.3 mg/dL    Albumin 3.6 3.4 - 5.0 g/dL    Protein Total 6.8 6.8 - 8.8 g/dL    Alkaline Phosphatase 49 40 - 150 U/L    ALT 32 0 - 70 U/L    AST 28 0 - 45 U/L   CBC with platelets differential   Result Value Ref [place]    WBC 17.0 (H) 4.0 - 11.0 10e9/L    RBC Count 4.85 4.4 - 5.9 10e12/L    Hemoglobin 15.6 13.3 - 17.7 g/dL    Hematocrit 43.6 40.0 - 53.0 %    MCV 90 78 - 100 fl    MCH 32.2 26.5 - 33.0 pg    MCHC 35.8 31.5 - 36.5 g/dL    RDW 11.8 10.0 - 15.0 %    Platelet Count 219 150 - 450 10e9/L    Diff Method Automated Method     % Neutrophils 89.4 %    % Lymphocytes 5.6 %    % Monocytes 3.5 %    % Eosinophils 0.3 %    % Basophils 0.5 %    % Immature Granulocytes 0.7 %    Nucleated RBCs 0 0 /100    Absolute Neutrophil 15.2 (H) 1.6 - 8.3 10e9/L    Absolute Lymphocytes 1.0 0.8 - 5.3 10e9/L    Absolute Monocytes 0.6 0.0 - 1.3 10e9/L    Absolute Eosinophils 0.1 0.0 - 0.7 10e9/L    Absolute Basophils 0.1 0.0 - 0.2 10e9/L    Abs Immature Granulocytes 0.1 0 - 0.4 10e9/L    Absolute Nucleated RBC 0.0    Lactate for Sepsis Protocol   Result Value Ref [place]    Lactate for Sepsis Protocol 1.0 0.7 - 2.0 mmol/L   D dimer quantitative   Result Value Ref [place]    D Dimer 0.6 (H) 0.0 - 0.50 ug/ml FEU   Troponin I   Result Value Ref [place]    Troponin I ES <0.015 0.000 - 0.045 ug/L   Influenza A and B and RSV PCR   Result Value Ref [place]    Specimen Description Nasopharyngeal     Influenza A PCR Negative NEG^Negative    Influenza B PCR Negative NEG^Negative    Resp Syncytial Virus Negative  NEG^Negative       Medications   0.9% sodium chloride BOLUS (has no administration in time range)     Followed by   sodium chloride 0.9% infusion (has no administration in time range)   acetaminophen (TYLENOL) tablet 975 mg (975 mg Oral Given 4/23/20 5488)       Assessments & Plan (with Medical Decision Making)     44-year-old male presents today with complaints of fever and right chest wall pain worse with inspiration.  Labs as above and chest x-ray showed a right middle lobe pneumonia.  D-dimer was slightly elevated and a CT of the chest was done to rule out PE and was negative for PE but confirmed a right upper lobe pneumonia.  Patient treated supportively here in the emergency room room and felt better at time of discharge.  Sats were 95% on room air.  Patient given antibiotics in the form of Rocephin and and azithromycin.  Patient discharged on amoxicillin and azithromycin.  Patient understands to return if he develops any new or worsening symptoms.  COVID 19 test pending.    Due to the nature of this electronic medical record, laboratory results, imaging results, diagnosis, other information and medications reported above may not represent information available to me at the the time of my care and disposition. Medications reported above may have not been ordered by me.     Portions of the record may have been created with voice recognition software. Occasional wrong-word or 'sound-a- like' substitution may have occurred due to the inherent limitations of voice recognition software. Though the chart has been reviewed, there may be inadvertent transcription errors. Read the chart carefully and recognize, using context, where substitutions have occurred.       New Prescriptions    No medications on file       Final diagnoses:   Pneumonia of right middle lobe due to infectious organism (H)       4/23/2020   [facility]     Jose Mosley MD  04/24/20 1170

## 2020-07-20 ENCOUNTER — NURSE TRIAGE (OUTPATIENT)
Dept: FAMILY MEDICINE | Facility: OTHER | Age: 45
End: 2020-07-20

## 2020-07-20 NOTE — TELEPHONE ENCOUNTER
"Discharge Instructions for COVID-19 Patients  You have--or may have--COVID-19. Please follow the instructions listed below.   If you have a weakened immune system, discuss with your doctor any other actions you need to take.  How can I protect others?  If you have symptoms (fever, cough, body aches or trouble breathing):  Stay home and away from others (self-isolate) until:  At least 10 days have passed since your symptoms started. And   You've had no fever--and no medicine that reduces fever--for 3 full days (72 hours). And   Your other symptoms have resolved (gotten better).  If you don't show symptoms, but testing showed that you have COVID-19:  Stay home and away from others (self-isolate) until at least 10 days have passed since the date of your first positive COVID-19 test.  During this time  Stay in your own room, even for meals. Use your own bathroom if you can.  Stay away from others in your home. No hugging, kissing or shaking hands. No visitors.  Don't go to work, school or anywhere else.  Clean \"high touch\" surfaces often (doorknobs, counters, handles). Use household cleaning spray or wipes. You'll find a full list of  on the EPA website: www.epa.gov/pesticide-registration/list-n-disinfectants-use-against-sars-cov-2.  Cover your mouth and nose with a mask, tissue or wash cloth to avoid spreading germs.  Wash your hands and face often. Use soap and water.  Caregivers in these groups are at risk for severe illness due to COVID-19:  People 65 years and older  People who live in a nursing home or long-term care facility  People with chronic disease (lung, heart, cancer, diabetes, kidney, liver, immunologic)  People who have a weakened immune system, including those who:  Are in cancer treatment  Take medicine that weakens the immune system, such as corticosteroids  Had a bone marrow or organ transplant  Have an immune deficiency  Have poorly controlled HIV or AIDS  Are obese (body mass index of 40 or " higher)  Smoke regularly  Caregivers should wear gloves while washing dishes, handling laundry and cleaning bedrooms and bathrooms.  Use caution when washing and drying laundry: Don't shake dirty laundry and use the warmest water setting that you can.  For more tips on managing your health at home, go to www.cdc.gov/coronavirus/2019-ncov/downloads/10Things.pdf.  How can I take care of myself at home?  Get lots of rest. Drink extra fluids (unless a doctor has told you not to).  Take Tylenol (acetaminophen) for fever or pain. If you have liver or kidney problems, ask your family doctor if it's okay to take Tylenol.     Adults can take either:  650 mg (two 325 mg pills) every 4 to 6 hours, or   1,000 mg (two 500 mg pills) every 8 hours as needed.  Note: Don't take more than 3,000 mg in one day. Acetaminophen is found in many medicines (both prescribed and over-the-counter medicines). Read all labels to be sure you don't take too much.   For children, check the Tylenol bottle for the right dose. The dose is based on the child's age or weight.  If you have other health problems (like cancer, heart failure, an organ transplant or severe kidney disease): Call your specialty clinic if you don't feel better in the next 2 days.  Know when to call 911. Emergency warning signs include:  Trouble breathing or shortness of breath  Pain or pressure in the chest that doesn't go away  Feeling confused like you haven't felt before, or not being able to wake up  Bluish-colored lips or face  Your doctor may have prescribed a blood thinner medicine. Follow their instructions.  Where can I get more information?  [facility] - About COVID-19:   [email or web address]  CDC - What to Do If You're Sick: www.cdc.gov/coronavirus/2019-ncov/about/steps-when-sick.html  CDC - Ending Home Isolation: www.cdc.gov/coronavirus/2019-ncov/hcp/disposition-in-home-patients.html  CDC - Caring for Someone:  www.cdc.gov/coronavirus/2019-ncov/if-you-are-sick/care-for-someone.html  [facility] - Interim Guidance for Hospital Discharge to Home: www.health.[facility].mn.us/diseases/coronavirus/hcp/hospdischarge.pdf  [facility] clinical trials (COVID-19 research studies): clinicalaffairs.[facility]-clinical-trials  Below are the COVID-19 hotlines at the [place] Department of Health ([facility]). Interpreters are available.  For health questions: Call 661-958-5049 or 1-571.381.7611 (7 a.m. to 7 p.m.)  For questions about schools and childcare: Call 045-505-7210 or 1-677.108.9823 (7 a.m. to 7 p.m.)    For informational purposes only. Not to replace the advice of your health care provider. Clinically reviewed by the Infection Prevention Team.Copyright   2020 [facility]. All rights reserved. "[employer] 483369 - 06/20.  o  p.m.)                         Instructions for Patients  To schedule an appointment for curbside testing:    Provider/Staff to call and schedule the patient for the appointment per the System Ambulatory Workflow recommendations    Be sure to obtain details about the patients  care for the      Your symptoms show that you may have coronavirus (COVID-19). This illness can cause fever, cough and trouble breathing.     We would like to test you for this virus. This will be a curbside test--you will drive to the clinic, and we will test you in your car.    Please follow these steps:    1. We will call to schedule your test. Be ready to share details about the car you ll be in.   2. A member of our care team will ask you some questions. Then, they will use a swab to collect samples from your nose and throat.     We will test your samples for COVID-19, the flu and maybe other illnesses as well. We will call to share your test results.    How can I protect others?    Stay home and away from others (self-isolate) until:    You ve had no fever--and no medicine that reduces fever--for 3 full days (21  hours). And      Your other symptoms have resolved (gotten better). For example, your cough or breathing has improved. And     At least 10 days have passed since your symptoms started.    Stay at least 6 feet away from others. (If someone will drive you to your test, stay in the backseat, as far away from the  as you can.)     Don t go to work, school or anywhere else. When it s time for your test, go straight to the testing site. Don t make any stops on the way there or back.     Wash your hands and face often. Use soap and water.     Cover your mouth and nose with a mask, tissue or washcloth.     Don t touch anyone. No hugging, kissing or handshakes.    How can I take care of myself?    1. Get lots of rest. Drink extra fluids (unless a doctor has told you not to).     2. Take Tylenol (acetaminophen) for fever or pain. If you have liver or kidney problems, ask your family doctor if it's okay to take Tylenol.     Adults can take either:     650 mg (two 325 mg pills) every 4 to 6 hours, or     1,000 mg (two 500 mg pills) every 8 hours as needed.     Note: Don't take more than 3,000 mg in one day.   Acetaminophen is found in many medicines (both prescribed and over-the-counter medicines). Read all labels to be sure you don't take too much.   For children, check the Tylenol bottle for the right dose. The dose is based on  the child's age or weight.    3. If you have other health problems (like cancer, heart failure, an organ transplant or severe kidney disease): Call your specialty clinic if you don't feel better in the next 2 days.    4. Know when to call 911: If your breathing is so bad that it keeps you from doing normal activities, call 911 or go to the emergency room. Tell them that you've been staying home and may have COVID-19.      Thank you for taking steps to prevent the spread of this virus.  o Limit your contact with others.  o Wear a simple mask to cover your cough.  o Wash your hands well and  often.  o If you need medical care, go to [email or web address] or contact your health care provider.     For more about COVID-19 and caring for yourself at home, visit the CDC website at https://www.cdc.gov/coronavirus/2019-ncov/about/steps-when-sick.html.     To learn about care at [facility], please go to [email or web address].     Below are the COVID-19 hotlines at the [place] Department of Health ([facility]). Interpreters are available.     For health questions: Call 922-427-8709 or 1-537.280.6614 (7 a.m. to 7 p.m.)    For questions about schools and childcare: Call 246-376-1147 or 1-391.244.4142 (7 a.m. to 7 p.m.)                  COVID 19 Nurse Triage Plan/Patient Instructions    Please be aware that novel coronavirus (COVID-19) may be circulating in the community. If you develop symptoms such as fever, cough, or SOB or if you have concerns about the presence of another infection including coronavirus (COVID-19), please contact your health care provider or visit [email or web address].     Disposition/Instructions    Home care recommended. Follow home care protocol based instructions.    Thank you for taking steps to prevent the spread of this virus.  o Limit your contact with others.  o Wear a simple mask to cover your cough.  o Wash your hands well and often.    Resources    [facility]: About COVID-19: [email or web address]    CDC: What to Do If You're Sick: www.cdc.gov/coronavirus/2019-ncov/about/steps-when-sick.html    CDC: Ending Home Isolation: www.cdc.gov/coronavirus/2019-ncov/hcp/disposition-in-home-patients.html     CDC: Caring for Someone: www.cdc.gov/coronavirus/2019-ncov/if-you-are-sick/care-for-someone.html     [facility]: Interim Guidance for Hospital Discharge to Home: www.health.[facility].mn.us/diseases/coronavirus/hcp/hospdischarge.pdf    [facility] clinical trials (COVID-19 research studies): clinicalaffairs.[facility]-clinical-trials     Below are the  "COVID-19 hotlines at the [place] Department of Health ([facility]). Interpreters are available.   o For health questions: Call 111-754-1655 or 1-132.468.3674 (7 a.m. to 7 p.m.)  o For questions about schools and childcare: Call 572-415-9837 or 1-343.366.3104 (7 a.m. to 7 p.m.)                     Reason for Disposition    [1] COVID-19 EXPOSURE (Close Contact) within last 14 days AND [2] needs COVID-19 lab test to return to work AND [3] NO symptoms    Additional Information    Negative: COVID-19 has been diagnosed by a healthcare provider (HCP)    Negative: COVID-19 lab test positive    Negative: [1] Symptoms of COVID-19 (e.g., cough, fever, SOB, or others) AND [2] lives in an area with community spread    Negative: [1] Symptoms of COVID-19 (e.g., cough, fever, SOB, or others) AND [2] within 14 days of EXPOSURE (close contact) with diagnosed or suspected COVID-19 patient    Negative: [1] Symptoms of COVID-19 (e.g., cough, fever, SOB, or others) AND [2] within 14 days of travel from high-risk area for COVID-19 community spread (identified by CDC)    Negative: [1] Difficulty breathing (shortness of breath) occurs AND [2] onset > 14 days after COVID-19 EXPOSURE (Close Contact) AND [3] no community spread where patient lives    Negative: [1] Dry cough occurs AND [2] onset > 14 days after COVID-19 EXPOSURE AND [3] no community spread where patient lives    Negative: [1] Wet cough (i.e., white-yellow, yellow, green, or asuncion colored sputum) AND [2] onset > 14 days after COVID-19 EXPOSURE AND [3] no community spread where patient lives    Negative: [1] Common cold symptoms AND [2] onset > 14 days after COVID-19 EXPOSURE AND [3] no community spread where patient lives    Answer Assessment - Initial Assessment Questions  1. CLOSE CONTACT: \"Who is the person with the confirmed or suspected COVID-19 infection that you were exposed to?\"      Boss out in [place] is positive toay  2. PLACE of CONTACT: \"Where were you when you were " "exposed to COVID-19?\" (e.g., home, school, medical waiting room; which city?)      Sitting next to in same break room for more than 15 minutes and less than 6 feet  3. TYPE of CONTACT: \"How much contact was there?\" (e.g., sitting next to, live in same house, work in same office, same building)     Same room  4. DURATION of CONTACT: \"How long were you in contact with the COVID-19 patient?\" (e.g., a few seconds, passed by person, a few minutes, live with the patient)      45 mintues  5. DATE of CONTACT: \"When did you have contact with a COVID-19 patient?\" (e.g., how many days ago)      8 days ago  6. TRAVEL: \"Have you traveled out of the country recently?\" If so, \"When and where?\"      * Also ask about out-of-state travel, since the [facility] has identified some high-risk cities for community spread in the .      * Note: Travel becomes less relevant if there is widespread community transmission where the patient lives.  no  7. COMMUNITY SPREAD: \"Are there lots of cases of COVID-19 (community spread) where you live?\" (See public health department website, if unsure)       Yes  8. SYMPTOMS: \"Do you have any symptoms?\" (e.g., fever, cough, breathing difficulty)   none  9. PREGNANCY OR POSTPARTUM: \"Is there any chance you are pregnant?\" \"When was your last menstrual period?\" \"Did you deliver in the last 2 weeks?\"      * RISK: \"Do you have any heart or lung problems? Do you have a weak immune system?\" (e.g., CHF, COPD, asthma, HIV positive, chemotherapy, renal failure, diabetes mellitus, sickle cell anemia)        *No Answer*    Protocols used: CORONAVIRUS (COVID-19) EXPOSURE-A- 5.16.20      "

## 2020-07-21 ENCOUNTER — OFFICE VISIT (OUTPATIENT)
Dept: FAMILY MEDICINE | Facility: OTHER | Age: 45
End: 2020-07-21
Attending: NURSE PRACTITIONER
Payer: COMMERCIAL

## 2020-07-21 DIAGNOSIS — Z20.822 EXPOSURE TO 2019 NOVEL CORONAVIRUS: Primary | ICD-10-CM

## 2020-07-21 PROCEDURE — U0003 INFECTIOUS AGENT DETECTION BY NUCLEIC ACID (DNA OR RNA); SEVERE ACUTE RESPIRATORY SYNDROME CORONAVIRUS 2 (SARS-COV-2) (CORONAVIRUS DISEASE [COVID-19]), AMPLIFIED PROBE TECHNIQUE, MAKING USE OF HIGH THROUGHPUT TECHNOLOGIES AS DESCRIBED BY CMS-2020-01-R: HCPCS | Performed by: FAMILY MEDICINE

## 2020-07-21 NOTE — LETTER
July 23, 2020        Nehemias Miller  [address] [place] [address]    This letter provides a written record that you were tested for COVID-19 on 07/21/2020.       Your result was negative. This means that we didn t find the virus that causes COVID-19 in your sample. A test may show negative when you do actually have the virus. This can happen when the virus is in the early stages of infection, before you feel illness symptoms.    If you have symptoms   Stay home and away from others (self-isolate) until you meet ALL of the guidelines below:    You ve had no fever--and no medicine that reduces fever--for 3 full days (72 hours). And      Your other symptoms have gotten better. For example, your cough or breathing has improved. And     At least 10 days have passed since your symptoms started.    During this time:    Stay home. Don t go to work, school or anywhere else.     Stay in your own room, including for meals. Use your own bathroom if you can.    Stay away from others in your home. No hugging, kissing or shaking hands. No visitors.    Clean  high touch  surfaces often (doorknobs, counters, handles, etc.). Use a household cleaning spray or wipes. You can find a full list on the EPA website at www.epa.gov/pesticide-registration/list-n-disinfectants-use-against-sars-cov-2.    Cover your mouth and nose with a mask, tissue or washcloth to avoid spreading germs.    Wash your hands and face often with soap and water.    Going back to work  Check with your employer for any guidelines to follow for going back to work.    Employers: This document serves as formal notice that your employee tested negative for COVID-19, as of the testing date shown above.

## 2020-07-23 LAB
SARS-COV-2 RNA SPEC QL NAA+PROBE: NOT DETECTED
SPECIMEN SOURCE: NORMAL

## 2020-12-14 ENCOUNTER — HEALTH MAINTENANCE LETTER (OUTPATIENT)
Age: 45
End: 2020-12-14

## 2021-05-26 ENCOUNTER — RECORDS - HEALTHEAST (OUTPATIENT)
Dept: ADMINISTRATIVE | Facility: CLINIC | Age: 46
End: 2021-05-26

## 2021-10-02 ENCOUNTER — HEALTH MAINTENANCE LETTER (OUTPATIENT)
Age: 46
End: 2021-10-02

## 2022-01-22 ENCOUNTER — HEALTH MAINTENANCE LETTER (OUTPATIENT)
Age: 47
End: 2022-01-22

## 2022-09-04 ENCOUNTER — HEALTH MAINTENANCE LETTER (OUTPATIENT)
Age: 47
End: 2022-09-04

## 2022-12-22 ENCOUNTER — HOSPITAL ENCOUNTER (EMERGENCY)
Facility: HOSPITAL | Age: 47
Discharge: HOME | End: 2022-12-22
Attending: EMERGENCY MEDICINE | Admitting: EMERGENCY MEDICINE
Payer: MEDICAID

## 2022-12-22 ENCOUNTER — APPOINTMENT (OUTPATIENT)
Dept: GENERAL RADIOLOGY | Facility: HOSPITAL | Age: 47
End: 2022-12-22
Attending: EMERGENCY MEDICINE
Payer: MEDICAID

## 2022-12-22 VITALS
SYSTOLIC BLOOD PRESSURE: 134 MMHG | RESPIRATION RATE: 26 BRPM | OXYGEN SATURATION: 95 % | HEART RATE: 87 BPM | DIASTOLIC BLOOD PRESSURE: 91 MMHG

## 2022-12-22 DIAGNOSIS — R07.9 CHEST PAIN, UNSPECIFIED TYPE: ICD-10-CM

## 2022-12-22 LAB
ALBUMIN SERPL BCG-MCNC: 4.6 G/DL (ref 3.5–5.2)
ALP SERPL-CCNC: 57 U/L (ref 40–129)
ALT SERPL W P-5'-P-CCNC: 46 U/L (ref 10–50)
ANION GAP SERPL CALCULATED.3IONS-SCNC: 16 MMOL/L (ref 7–15)
AST SERPL W P-5'-P-CCNC: 68 U/L (ref 10–50)
BASE EXCESS BLDV CALC-SCNC: 1.9 MMOL/L (ref -7.7–1.9)
BASOPHILS # BLD AUTO: 0.1 10E3/UL (ref 0–0.2)
BASOPHILS NFR BLD AUTO: 1 %
BILIRUB SERPL-MCNC: 0.5 MG/DL
BUN SERPL-MCNC: 8.3 MG/DL (ref 6–20)
CALCIUM SERPL-MCNC: 8.8 MG/DL (ref 8.6–10)
CHLORIDE SERPL-SCNC: 98 MMOL/L (ref 98–107)
CREAT SERPL-MCNC: 1.01 MG/DL (ref 0.67–1.17)
DEPRECATED HCO3 PLAS-SCNC: 23 MMOL/L (ref 22–29)
EOSINOPHIL # BLD AUTO: 0.1 10E3/UL (ref 0–0.7)
EOSINOPHIL NFR BLD AUTO: 2 %
ERYTHROCYTE [DISTWIDTH] IN BLOOD BY AUTOMATED COUNT: 12.4 % (ref 10–15)
ETHANOL SERPL-MCNC: 0.2 G/DL
FLUAV RNA SPEC QL NAA+PROBE: NEGATIVE
FLUBV RNA RESP QL NAA+PROBE: NEGATIVE
GFR SERPL CREATININE-BSD FRML MDRD: >90 ML/MIN/1.73M2
GLUCOSE SERPL-MCNC: 116 MG/DL (ref 70–99)
HCO3 BLDV-SCNC: 27 MMOL/L (ref 21–28)
HCT VFR BLD AUTO: 48 % (ref 40–53)
HGB BLD-MCNC: 17.3 G/DL (ref 13.3–17.7)
HOLD SPECIMEN: NORMAL
IMM GRANULOCYTES # BLD: 0 10E3/UL
IMM GRANULOCYTES NFR BLD: 0 %
INR PPP: 1.03 (ref 0.85–1.15)
LACTATE SERPL-SCNC: 2.2 MMOL/L (ref 0.7–2)
LYMPHOCYTES # BLD AUTO: 2.7 10E3/UL (ref 0.8–5.3)
LYMPHOCYTES NFR BLD AUTO: 35 %
MCH RBC QN AUTO: 32.3 PG (ref 26.5–33)
MCHC RBC AUTO-ENTMCNC: 36 G/DL (ref 31.5–36.5)
MCV RBC AUTO: 90 FL (ref 78–100)
MONOCYTES # BLD AUTO: 0.4 10E3/UL (ref 0–1.3)
MONOCYTES NFR BLD AUTO: 5 %
NEUTROPHILS # BLD AUTO: 4.4 10E3/UL (ref 1.6–8.3)
NEUTROPHILS NFR BLD AUTO: 57 %
NRBC # BLD AUTO: 0 10E3/UL
NRBC BLD AUTO-RTO: 0 /100
O2/TOTAL GAS SETTING VFR VENT: 21 %
OXYHGB MFR BLDV: 88 % (ref 70–75)
PCO2 BLDV: 41 MM HG (ref 40–50)
PH BLDV: 7.42 [PH] (ref 7.32–7.43)
PLATELET # BLD AUTO: 311 10E3/UL (ref 150–450)
PO2 BLDV: 115 MM HG (ref 25–47)
POTASSIUM SERPL-SCNC: 3.9 MMOL/L (ref 3.4–5.3)
PROT SERPL-MCNC: 7.3 G/DL (ref 6.4–8.3)
RBC # BLD AUTO: 5.35 10E6/UL (ref 4.4–5.9)
RSV RNA SPEC NAA+PROBE: NEGATIVE
SARS-COV-2 RNA RESP QL NAA+PROBE: NEGATIVE
SODIUM SERPL-SCNC: 137 MMOL/L (ref 136–145)
TROPONIN T SERPL HS-MCNC: 9 NG/L
WBC # BLD AUTO: 7.7 10E3/UL (ref 4–11)

## 2022-12-22 PROCEDURE — 87637 SARSCOV2&INF A&B&RSV AMP PRB: CPT | Performed by: EMERGENCY MEDICINE

## 2022-12-22 PROCEDURE — 83605 ASSAY OF LACTIC ACID: CPT | Performed by: EMERGENCY MEDICINE

## 2022-12-22 PROCEDURE — 84484 ASSAY OF TROPONIN QUANT: CPT | Performed by: EMERGENCY MEDICINE

## 2022-12-22 PROCEDURE — 85610 PROTHROMBIN TIME: CPT | Performed by: EMERGENCY MEDICINE

## 2022-12-22 PROCEDURE — 93010 ELECTROCARDIOGRAM REPORT: CPT | Performed by: INTERNAL MEDICINE

## 2022-12-22 PROCEDURE — 99283 EMERGENCY DEPT VISIT LOW MDM: CPT | Performed by: EMERGENCY MEDICINE

## 2022-12-22 PROCEDURE — 71045 X-RAY EXAM CHEST 1 VIEW: CPT

## 2022-12-22 PROCEDURE — 82077 ASSAY SPEC XCP UR&BREATH IA: CPT | Performed by: EMERGENCY MEDICINE

## 2022-12-22 PROCEDURE — 36415 COLL VENOUS BLD VENIPUNCTURE: CPT | Performed by: EMERGENCY MEDICINE

## 2022-12-22 PROCEDURE — 82805 BLOOD GASES W/O2 SATURATION: CPT | Performed by: EMERGENCY MEDICINE

## 2022-12-22 PROCEDURE — 93005 ELECTROCARDIOGRAM TRACING: CPT | Performed by: EMERGENCY MEDICINE

## 2022-12-22 PROCEDURE — C9803 HOPD COVID-19 SPEC COLLECT: HCPCS | Performed by: EMERGENCY MEDICINE

## 2022-12-22 PROCEDURE — 80053 COMPREHEN METABOLIC PANEL: CPT | Performed by: EMERGENCY MEDICINE

## 2022-12-22 PROCEDURE — 99285 EMERGENCY DEPT VISIT HI MDM: CPT | Mod: 25 | Performed by: EMERGENCY MEDICINE

## 2022-12-22 PROCEDURE — 85025 COMPLETE CBC W/AUTO DIFF WBC: CPT | Performed by: EMERGENCY MEDICINE

## 2022-12-22 ASSESSMENT — ACTIVITIES OF DAILY LIVING (ADL): ADLS_ACUITY_SCORE: 35

## 2022-12-23 NOTE — ED NOTES
"Pt presents to the [facility] as  a rapid response. When the team arrived pt was sitting in a wheelchair. Pt seemed confused, was unable to remember his birthday, and kept saying not to tell anyone he was here. Pt states that his brother brought him and he's out in the parking lot in a hummer. Pt brought back to ED4 in a wheelchair. Pt reports that he had a seizure and his brother found him which is why he is here. Pt admits to drinking and reports that he drinks a \"case\" of beer daily. Pt also smokes three packs of cigarettes daily. Pt denies any drug use. Pt is cooperative with staff. Pt is scared because he is on parole, pt was reassured that his visit is confidential. Pt states he has seizures all that time, \"I can feel them coming on and I hide because I'm embarrassed. My muscles tense up and I get confused and lightheaded.\" Pt has not been treated for seizures. Pt also is unsure if they are from drinking or not. Pt also reports a short 30 sec period of left anterior chest pain tonight. Pt denies all pain at this time. Pt changed into gown, IV and labs obtained, EKG.  "

## 2022-12-23 NOTE — ED NOTES
Patient discharged home at this time. Patient given written and verbal discharge instructions regarding home care, f/u and medications. Patient verbalized understanding of all discharge instructions.     Pt was encouraged to wait 2 hours for a repeat troponin due to chest pain as part of chief complaint. Pt declined. Pt was informed to seek medical attention if chest pain resumes.

## 2022-12-23 NOTE — DISCHARGE INSTRUCTIONS
You came here for chest pain and for having a seizure. Your labwork was normal. We  recommended a repeat troponin (heart attack test) two hours from now, however you wished to go home. Please come back if you have chest pain or any other new symptoms that concern you.

## 2023-01-13 ASSESSMENT — ENCOUNTER SYMPTOMS
FEVER: 0
SHORTNESS OF BREATH: 0
CHILLS: 0

## 2023-01-13 NOTE — ED PROVIDER NOTES
History     Chief Complaint   Patient presents with     Alcohol Intoxication     Seizures     HPI  Nehemias Miller is a 47 year old male who is here after reportedly having a seizure and having some chest pain.  Patient states he has a history of seizures, will experience shaking and confusion afterwards.  Has never had a work-up.  Does not tell any of his family members because he does not want them to know because he is embarrassed.  States he had chest pain after waking up, sternal, no pressure, sharp, nonradiating.  Patient denies urinating on himself or biting his tongue.  No difficulty breathing.  No chest pain at this time.  Patient states that he also has been drinking daily for quite some time.    Allergies:  No Known Allergies    Problem List:    Patient Active Problem List    Diagnosis Date Noted     ADHD (attention deficit hyperactivity disorder) 12/16/2019     Priority: Medium     IMO Update       Panic anxiety syndrome 12/16/2019     Priority: Medium     IMO Update 10/11       Tobacco dependency 12/16/2019     Priority: Medium     IMO Update 10/11       Depressive disorder, not elsewhere classified 04/04/2005     Priority: Medium     And anxiety       Alcohol abuse 04/01/2005     Priority: Medium     Alcoholism  IMO Update 10/11       Asthma 06/10/2002     Priority: Medium     IMO Update 10 2016          Past Medical History:    History reviewed. No pertinent past medical history.    Past Surgical History:    Past Surgical History:   Procedure Laterality Date      UROLOGY SURGERY PROCEDURE Left     testical torsion repair     HERNIA REPAIR Left     inguinal       Family History:    Family History   Problem Relation Age of Onset     Cancer Mother      Cancer Maternal Grandmother      Cancer Other        Social History:  Marital Status:  Single [1]  Social History     Tobacco Use     Smoking status: Every Day     Packs/day: 3.00     Types: Cigarettes     Smokeless tobacco: Current   Substance Use  Topics     Alcohol use: Not Currently     Comment: daily     Drug use: No        Medications:    No current outpatient medications on file.        Review of Systems   Constitutional: Negative for chills and fever.   Respiratory: Negative for shortness of breath.    Cardiovascular: Positive for chest pain.   All other systems reviewed and are negative.      Physical Exam   BP: (!) 154/106  Pulse: 105  Resp: 20  SpO2: 96 %      Physical Exam  Constitutional:       General: He is not in acute distress.     Appearance: Normal appearance.      Comments: No tongue tremor, no hand tremor   HENT:      Head: Normocephalic and atraumatic.      Right Ear: External ear normal.      Left Ear: External ear normal.      Nose: Nose normal. No rhinorrhea.   Eyes:      Conjunctiva/sclera: Conjunctivae normal.   Cardiovascular:      Rate and Rhythm: Normal rate and regular rhythm.      Pulses: Normal pulses.   Pulmonary:      Effort: Pulmonary effort is normal. No respiratory distress.      Breath sounds: Normal breath sounds.   Abdominal:      General: There is no distension.      Palpations: Abdomen is soft.      Tenderness: There is no abdominal tenderness.   Musculoskeletal:         General: No deformity or signs of injury.   Skin:     General: Skin is warm and dry.      Capillary Refill: Capillary refill takes less than 2 seconds.   Neurological:      General: No focal deficit present.      Mental Status: He is alert and oriented to person, place, and time. Mental status is at baseline.      Comments: CN II through XII intact, finger-nose quick and coordinated bilaterally, gait steady, 5 out of 5 strength to bilateral upper and lower extremities   Psychiatric:         Mood and Affect: Mood normal.         Behavior: Behavior normal.         ED Course              ED Course as of 01/13/23 0534   Thu Dec 22, 2022   2137 Patient wants to leave, is clinically sober.     Procedures              EKG Interpretation:      Interpreted by  Frederick Pearson MD  Time reviewed:   Symptoms at time of EKG: earlier chest pain   Rhythm: normal sinus   Rate: normal  Axis: normal  Ectopy: none  Conduction: normal  ST Segments/ T Waves: No ST-T wave changes  Q Waves: none  Comparison to prior:     Clinical Impression: normal EKG          Critical Care time:  none               No results found for this or any previous visit (from the past 24 hour(s)).    Medications - No data to display    Assessments & Plan (with Medical Decision Making)     I have reviewed the nursing notes.    I have reviewed the findings, diagnosis, plan and need for follow up with the patient.    47-year-old male here with report of seizure and chest pain.  Patient's lactic acid was not elevated so I have a low suspicion this was a real seizure.  May be pseudoseizure.  Not alcohol withdrawal seizure is alcohol level 0.2.  While alcohol level 0.2, patient is clinically sober.  Patient did not want to stay for second troponin, I have a low suspicion he is having ACS as he is chest pain-free, no ischemia on EKG.  Patient okay to be discharged however encouraged to come back if his symptoms return.       Medical Decision Making  The patient presented with a problem that is an acute health issue posing potential threat to life or bodily function.    The patient's evaluation involved:  strong consideration of a test (see separate area of note for details) that was ultimately deferred    The patient's management involved a decision regarding hospitalization.        There are no discharge medications for this patient.      Final diagnoses:   Chest pain, unspecified type       12/22/2022   [facility]     Frederick Pearson MD  01/13/23 1852

## 2023-01-16 ENCOUNTER — OFFICE VISIT (OUTPATIENT)
Dept: FAMILY MEDICINE | Facility: OTHER | Age: 48
End: 2023-01-16
Attending: NURSE PRACTITIONER
Payer: MEDICAID

## 2023-01-16 VITALS
OXYGEN SATURATION: 94 % | BODY MASS INDEX: 25.41 KG/M2 | WEIGHT: 177.1 LBS | DIASTOLIC BLOOD PRESSURE: 72 MMHG | HEART RATE: 90 BPM | TEMPERATURE: 98.7 F | RESPIRATION RATE: 18 BRPM | SYSTOLIC BLOOD PRESSURE: 112 MMHG

## 2023-01-16 DIAGNOSIS — J02.9 EXUDATIVE PHARYNGITIS: ICD-10-CM

## 2023-01-16 DIAGNOSIS — Z13.220 SCREENING FOR HYPERLIPIDEMIA: ICD-10-CM

## 2023-01-16 DIAGNOSIS — J45.20 MILD INTERMITTENT ASTHMA, UNSPECIFIED WHETHER COMPLICATED: Primary | ICD-10-CM

## 2023-01-16 DIAGNOSIS — L98.9 FACIAL LESION: ICD-10-CM

## 2023-01-16 DIAGNOSIS — Z12.11 SPECIAL SCREENING FOR MALIGNANT NEOPLASMS, COLON: ICD-10-CM

## 2023-01-16 PROBLEM — F41.0 PANIC ANXIETY SYNDROME: Status: RESOLVED | Noted: 2019-12-16 | Resolved: 2023-01-16

## 2023-01-16 PROCEDURE — 86696 HERPES SIMPLEX TYPE 2 TEST: CPT | Mod: ZL | Performed by: NURSE PRACTITIONER

## 2023-01-16 PROCEDURE — G0463 HOSPITAL OUTPT CLINIC VISIT: HCPCS | Performed by: NURSE PRACTITIONER

## 2023-01-16 PROCEDURE — 36415 COLL VENOUS BLD VENIPUNCTURE: CPT | Mod: ZL | Performed by: NURSE PRACTITIONER

## 2023-01-16 PROCEDURE — 99214 OFFICE O/P EST MOD 30 MIN: CPT | Performed by: NURSE PRACTITIONER

## 2023-01-16 PROCEDURE — 80061 LIPID PANEL: CPT | Mod: ZL | Performed by: NURSE PRACTITIONER

## 2023-01-16 RX ORDER — VALACYCLOVIR HYDROCHLORIDE 1 G/1
1000 TABLET, FILM COATED ORAL 3 TIMES DAILY
Qty: 21 TABLET | Refills: 0 | Status: SHIPPED | OUTPATIENT
Start: 2023-01-16 | End: 2023-01-23

## 2023-01-16 ASSESSMENT — PAIN SCALES - GENERAL: PAINLEVEL: MILD PAIN (2)

## 2023-01-16 ASSESSMENT — ASTHMA QUESTIONNAIRES
QUESTION_1 LAST FOUR WEEKS HOW MUCH OF THE TIME DID YOUR ASTHMA KEEP YOU FROM GETTING AS MUCH DONE AT WORK, SCHOOL OR AT HOME: SOME OF THE TIME
QUESTION_4 LAST FOUR WEEKS HOW OFTEN HAVE YOU USED YOUR RESCUE INHALER OR NEBULIZER MEDICATION (SUCH AS ALBUTEROL): NOT AT ALL
QUESTION_1 LAST FOUR WEEKS HOW MUCH OF THE TIME DID YOUR ASTHMA KEEP YOU FROM GETTING AS MUCH DONE AT WORK, SCHOOL OR AT HOME: SOME OF THE TIME
QUESTION_4 LAST FOUR WEEKS HOW OFTEN HAVE YOU USED YOUR RESCUE INHALER OR NEBULIZER MEDICATION (SUCH AS ALBUTEROL): NOT AT ALL
QUESTION_2 LAST FOUR WEEKS HOW OFTEN HAVE YOU HAD SHORTNESS OF BREATH: THREE TO SIX TIMES A WEEK
QUESTION_2 LAST FOUR WEEKS HOW OFTEN HAVE YOU HAD SHORTNESS OF BREATH: THREE TO SIX TIMES A WEEK
QUESTION_3 LAST FOUR WEEKS HOW OFTEN DID YOUR ASTHMA SYMPTOMS (WHEEZING, COUGHING, SHORTNESS OF BREATH, CHEST TIGHTNESS OR PAIN) WAKE YOU UP AT NIGHT OR EARLIER THAN USUAL IN THE MORNING: TWO OR THREE NIGHTS A WEEK
QUESTION_5 LAST FOUR WEEKS HOW WOULD YOU RATE YOUR ASTHMA CONTROL: SOMEWHAT CONTROLLED
ACT_TOTALSCORE: 16
QUESTION_3 LAST FOUR WEEKS HOW OFTEN DID YOUR ASTHMA SYMPTOMS (WHEEZING, COUGHING, SHORTNESS OF BREATH, CHEST TIGHTNESS OR PAIN) WAKE YOU UP AT NIGHT OR EARLIER THAN USUAL IN THE MORNING: ONCE A WEEK
QUESTION_5 LAST FOUR WEEKS HOW WOULD YOU RATE YOUR ASTHMA CONTROL: SOMEWHAT CONTROLLED
ACT_TOTALSCORE: 17

## 2023-01-16 ASSESSMENT — ANXIETY QUESTIONNAIRES
6. BECOMING EASILY ANNOYED OR IRRITABLE: NOT AT ALL
2. NOT BEING ABLE TO STOP OR CONTROL WORRYING: NOT AT ALL
IF YOU CHECKED OFF ANY PROBLEMS ON THIS QUESTIONNAIRE, HOW DIFFICULT HAVE THESE PROBLEMS MADE IT FOR YOU TO DO YOUR WORK, TAKE CARE OF THINGS AT HOME, OR GET ALONG WITH OTHER PEOPLE: NOT DIFFICULT AT ALL
GAD7 TOTAL SCORE: 0
5. BEING SO RESTLESS THAT IT IS HARD TO SIT STILL: NOT AT ALL
GAD7 TOTAL SCORE: 0
7. FEELING AFRAID AS IF SOMETHING AWFUL MIGHT HAPPEN: NOT AT ALL
1. FEELING NERVOUS, ANXIOUS, OR ON EDGE: NOT AT ALL
3. WORRYING TOO MUCH ABOUT DIFFERENT THINGS: NOT AT ALL

## 2023-01-16 ASSESSMENT — PATIENT HEALTH QUESTIONNAIRE - PHQ9
SUM OF ALL RESPONSES TO PHQ QUESTIONS 1-9: 0
5. POOR APPETITE OR OVEREATING: NOT AT ALL

## 2023-01-16 NOTE — LETTER
My Asthma Action Plan    Name: Nehemias Miller   YOB: 1975  Date: 1/16/2023   My doctor: Juju Goldman CNP   My clinic: [facility]        My Rescue Medicine:   None   My Asthma Severity:   Intermittent / Exercise Induced  Know your asthma triggers: exercise or sports             GREEN ZONE   Good Control    I feel good    No cough or wheeze    Can work, sleep and play without asthma symptoms       Take your asthma control medicine every day.     1. If exercise triggers your asthma, take your rescue medication    15 minutes before exercise or sports, and    During exercise if you have asthma symptoms  2. Spacer to use with inhaler: If you have a spacer, make sure to use it with your inhaler             YELLOW ZONE Getting Worse  I have ANY of these:    I do not feel good    Cough or wheeze    Chest feels tight    Wake up at night   1. Keep taking your Green Zone medications  2. Start taking your rescue medicine:    every 20 minutes for up to 1 hour. Then every 4 hours for 24-48 hours.  3. If you stay in the Yellow Zone for more than 12-24 hours, contact your doctor.  4. If you do not return to the Green Zone in 12-24 hours or you get worse, start taking your oral steroid medicine if prescribed by your provider.           RED ZONE Medical Alert - Get Help  I have ANY of these:    I feel awful    Medicine is not helping    Breathing getting harder    Trouble walking or talking    Nose opens wide to breathe       1. Take your rescue medicine NOW  2. If your provider has prescribed an oral steroid medicine, start taking it NOW  3. Call your doctor NOW  4. If you are still in the Red Zone after 20 minutes and you have not reached your doctor:    Take your rescue medicine again and    Call 911 or go to the emergency room right away    See your regular doctor within 2 weeks of an Emergency Room or Urgent Care visit for follow-up treatment.          Annual Reminders:  Meet with Asthma  Educator,  Flu Shot in the Fall, consider Pneumonia Vaccination for patients with asthma (aged 19 and older).    Pharmacy:    [id number] - [place], [place] - [address]  [address]  [facility] [address] [place] [facility] [address]    Electronically signed by Juju Goldman CNP   Date: 01/16/23                    Asthma Triggers  How To Control Things That Make Your Asthma Worse    Triggers are things that make your asthma worse.  Look at the list below to help you find your triggers and   what you can do about them. You can help prevent asthma flare-ups by staying away from your triggers.      Trigger                                                          What you can do   Cigarette Smoke  Tobacco smoke can make asthma worse. Do not allow smoking in your home, car or around you.  Be sure no one smokes at a child s day care or school.  If you smoke, ask your health care provider for ways to help you quit.  Ask family members to quit too.  Ask your health care provider for a referral to Quit Plan to help you quit smoking, or call 5-225-344-PLAN.     Colds, Flu, Bronchitis  These are common triggers of asthma. Wash your hands often.  Don t touch your eyes, nose or mouth.  Get a flu shot every year.     Dust Mites  These are tiny bugs that live in cloth or carpet. They are too small to see. Wash sheets and blankets in hot water every week.   Encase pillows and mattress in dust mite proof covers.  Avoid having carpet if you can. If you have carpet, vacuum weekly.   Use a dust mask and HEPA vacuum.   Pollen and Outdoor Mold  Some people are allergic to trees, grass, or weed pollen, or molds. Try to keep your windows closed.  Limit time out doors when pollen count is high.   Ask you health care provider about taking medicine during allergy season.     Animal Dander  Some people are allergic to skin flakes, urine or saliva from pets with fur or feathers. Keep pets with fur or feathers out  of your home.    If you can t keep the pet outdoors, then keep the pet out of your bedroom.  Keep the bedroom door closed.  Keep pets off cloth furniture and away from stuffed toys.     Mice, Rats, and Cockroaches  Some people are allergic to the waste from these pests.   Cover food and garbage.  Clean up spills and food crumbs.  Store grease in the refrigerator.   Keep food out of the bedroom.   Indoor Mold  This can be a trigger if your home has high moisture. Fix leaking faucets, pipes, or other sources of water.   Clean moldy surfaces.  Dehumidify basement if it is damp and smelly.   Smoke, Strong Odors, and Sprays  These can reduce air quality. Stay away from strong odors and sprays, such as perfume, powder, hair spray, paints, smoke incense, paint, cleaning products, candles and new carpet.   Exercise or Sports  Some people with asthma have this trigger. Be active!  Ask your doctor about taking medicine before sports or exercise to prevent symptoms.    Warm up for 5-10 minutes before and after sports or exercise.     Other Triggers of Asthma  Cold air:  Cover your nose and mouth with a scarf.  Sometimes laughing or crying can be a trigger.  Some medicines and food can trigger asthma.

## 2023-01-16 NOTE — LETTER
[facility]  [address]  [place] [address]  Phone: [phone]    January 16, 2023        Nehemias Miller  [address] [address]          To whom it may concern:    RE: Nehemias Miller      Please excuse from work 1/16 and 1/17/23.    Please contact me for questions or concerns.      Sincerely,        Juju Goldman, CNP

## 2023-01-16 NOTE — PROGRESS NOTES
Assessment & Plan          Mild intermittent asthma, unspecified whether complicated  - Continue plan of care      Exudative pharyngitis  - amoxicillin-clavulanate (AUGMENTIN) 875-125 MG tablet; Take 1 tablet by mouth 2 times daily for 7 days      Facial lesion  - Herpes Simplex Virus 1 and 2 IgG; Future  - valACYclovir (VALTREX) 1000 mg tablet; Take 1 tablet (1,000 mg) by mouth 3 times daily for 7 days      Screening for hyperlipidemia  - Lipid Profile; Future      Special screening for malignant neoplasms, colon  - COLOGUARD([employer]); Future        Nicotine/Tobacco Cessation:  He reports that he has been smoking cigarettes. He has been smoking an average of 3 packs per day. He uses smokeless tobacco.  Nicotine/Tobacco Cessation Plan:   Information offered: Patient not interested at this time        Juju Goldman CNP  [facility] - FATOU Del Cid is a 47 year old, presenting for the following health issues:  Sinus Problem and Mouth/Lip Problem        Acute Illness  Acute illness concerns: sinus problem  Onset/Duration: 1 week  Symptoms:  Fever: YES  Chills/Sweats: YES  Headache (location?): YES  Sinus Pressure: YES  Conjunctivitis:  No  Ear Pain: YES: left  Rhinorrhea: YES  Congestion: YES  Sore Throat: YES  Cough: YES-productive of yellow sputum  Wheeze: YES  Decreased Appetite: YES  Nausea: YES  Vomiting: YES  Diarrhea: YES  Dysuria/Freq.: YES  Dysuria or Hematuria: No  Fatigue/Achiness: YES  Sick/Strep Exposure: YES  Therapies tried and outcome: none          Concern - blisters  Onset: 1 week  Description: redness, blisters with drainage on and under penis and on mouth  Intensity: moderate  Progression of Symptoms:  same  Accompanying Signs & Symptoms: see above  Previous history of similar problem: none  Precipitating factors:        Worsened by: nothing  Alleviating factors:        Improved by: nothing  Therapies tried and outcome:  none           Asthma Follow-Up  Was ACT  completed today?    Yes    ACT Total Scores 1/16/2023   ACT TOTAL SCORE (Goal Greater than or Equal to 20) 16   In the past 12 months, how many times did you visit the emergency room for your asthma without being admitted to the hospital? 0   In the past 12 months, how many times were you hospitalized overnight because of your asthma? 0             How many days per week do you miss taking your asthma controller medication?  I do not have an asthma controller medication    Please describe any recent triggers for your asthma: None    Have you had any Emergency Room Visits, Urgent Care Visits, or Hospital Admissions since your last office visit?  No          Patient Active Problem List   Diagnosis     ADHD (attention deficit hyperactivity disorder)     Alcohol abuse     Asthma     Depressive disorder, not elsewhere classified     Panic anxiety syndrome     Tobacco dependency     Past Surgical History:   Procedure Laterality Date     HC UROLOGY SURGERY PROCEDURE Left     testical torsion repair     HERNIA REPAIR Left     inguinal       Social History     Tobacco Use     Smoking status: Every Day     Packs/day: 3.00     Types: Cigarettes     Smokeless tobacco: Current   Substance Use Topics     Alcohol use: Not Currently     Comment: daily     Family History   Problem Relation Age of Onset     Cancer Mother      Cancer Maternal Grandmother      Cancer Other              No current outpatient medications on file.         No Known Allergies         Recent Labs   Lab Test 12/22/22 2057 04/23/20  2340 11/24/16  1732   ALT 46 32 21   CR 1.01 0.93 1.26*   GFRESTIMATED >90 >90 64   GFRESTBLACK  --  >90 77   POTASSIUM 3.9 3.8 3.8          BP Readings from Last 3 Encounters:   01/16/23 112/72   12/22/22 134/91   04/24/20 124/68    Wt Readings from Last 3 Encounters:   01/16/23 80.3 kg (177 lb 1.6 oz)   04/23/20 81.6 kg (180 lb)   01/02/20 79.4 kg (175 lb)                 Review of Systems   Constitutional, HEENT,  cardiovascular, pulmonary, gi and gu systems are negative, except as otherwise noted.            Objective    /72 (BP Location: Left arm, Patient Position: Sitting, Cuff Size: Adult Regular)   Pulse 90   Temp 98.7  F (37.1  C) (Tympanic)   Resp 18   Wt 80.3 kg (177 lb 1.6 oz)   SpO2 94%   BMI 25.41 kg/m    Body mass index is 25.41 kg/m .           Physical Exam   GENERAL: healthy, alert and no distress  EYES: Eyes grossly normal to inspection, PERRL and conjunctivae and sclerae normal  HENT: tonsillar hypertrophy, tonsillar erythema and tonsillar exudate  NECK: no adenopathy, no asymmetry, masses, or scars and thyroid normal to palpation  RESP: lungs clear to auscultation - no rales, rhonchi or wheezes  CV: regular rate and rhythm, normal S1 S2, no S3 or S4, no murmur, click or rub, no peripheral edema and peripheral pulses strong  MS: no gross musculoskeletal defects noted, no edema  SKIN: fluid filled blisters - corners of mouth - and a cluster of blisters - fluid filled - base of penis, painful  PSYCH: mentation appears normal, affect normal/bright

## 2023-01-16 NOTE — PATIENT INSTRUCTIONS
Assessment & Plan          Mild intermittent asthma, unspecified whether complicated  - Continue plan of care      Exudative pharyngitis  - amoxicillin-clavulanate (AUGMENTIN) 875-125 MG tablet; Take 1 tablet by mouth 2 times daily for 7 days      Facial lesion  - Herpes Simplex Virus 1 and 2 IgG; Future  - valACYclovir (VALTREX) 1000 mg tablet; Take 1 tablet (1,000 mg) by mouth 3 times daily for 7 days      Screening for hyperlipidemia  - Lipid Profile; Future      Special screening for malignant neoplasms, colon  - COLOGUARD([employer]); Future        Nicotine/Tobacco Cessation:  He reports that he has been smoking cigarettes. He has been smoking an average of 3 packs per day. He uses smokeless tobacco.  Nicotine/Tobacco Cessation Plan:   Information offered: Patient not interested at this time        Juju Goldman CNP  [facility]

## 2023-01-17 LAB
CHOLEST SERPL-MCNC: 136 MG/DL
HDLC SERPL-MCNC: 62 MG/DL
LDLC SERPL CALC-MCNC: 57 MG/DL
NONHDLC SERPL-MCNC: 74 MG/DL
TRIGL SERPL-MCNC: 87 MG/DL

## 2023-01-18 LAB
HSV1 IGG SERPL QL IA: 31.1 INDEX
HSV1 IGG SERPL QL IA: ABNORMAL
HSV2 IGG SERPL QL IA: 3.07 INDEX
HSV2 IGG SERPL QL IA: ABNORMAL

## 2023-03-18 ASSESSMENT — ASTHMA QUESTIONNAIRES: ACT_TOTALSCORE: 17

## 2023-04-10 DIAGNOSIS — Z12.11 SPECIAL SCREENING FOR MALIGNANT NEOPLASMS, COLON: ICD-10-CM

## 2023-04-29 ENCOUNTER — HEALTH MAINTENANCE LETTER (OUTPATIENT)
Age: 48
End: 2023-04-29

## 2024-07-06 ENCOUNTER — HEALTH MAINTENANCE LETTER (OUTPATIENT)
Age: 49
End: 2024-07-06

## 2025-07-13 ENCOUNTER — HEALTH MAINTENANCE LETTER (OUTPATIENT)
Age: 50
End: 2025-07-13

## 2025-07-27 ENCOUNTER — HOSPITAL ENCOUNTER (EMERGENCY)
Facility: HOSPITAL | Age: 50
Discharge: HOME | End: 2025-07-27
Attending: PHYSICIAN ASSISTANT
Payer: MEDICAID

## 2025-07-27 VITALS
DIASTOLIC BLOOD PRESSURE: 118 MMHG | TEMPERATURE: 98.3 F | HEART RATE: 118 BPM | RESPIRATION RATE: 18 BRPM | OXYGEN SATURATION: 95 % | SYSTOLIC BLOOD PRESSURE: 154 MMHG
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
## 2025-07-27 PROCEDURE — 90471 IMMUNIZATION ADMIN: CPT | Performed by: PHYSICIAN ASSISTANT

## 2025-07-27 PROCEDURE — 12002 RPR S/N/AX/GEN/TRNK2.6-7.5CM: CPT | Performed by: PHYSICIAN ASSISTANT

## 2025-07-27 PROCEDURE — 250N000011 HC RX IP 250 OP 636: Performed by: PHYSICIAN ASSISTANT

## 2025-07-27 PROCEDURE — 250N000009 HC RX 250: Performed by: PHYSICIAN ASSISTANT

## 2025-07-27 PROCEDURE — 99284 EMERGENCY DEPT VISIT MOD MDM: CPT | Mod: 25 | Performed by: PHYSICIAN ASSISTANT

## 2025-07-27 PROCEDURE — 90715 TDAP VACCINE 7 YRS/> IM: CPT | Performed by: PHYSICIAN ASSISTANT

## 2025-07-27 RX ORDER — LIDOCAINE HYDROCHLORIDE 20 MG/ML
40 INJECTION, SOLUTION INFILTRATION; PERINEURAL ONCE
Status: COMPLETED | OUTPATIENT
Start: 2025-07-27 | End: 2025-07-27

## 2025-07-27 RX ADMIN — LIDOCAINE HYDROCHLORIDE 40 MG: 20 INJECTION, SOLUTION INFILTRATION; PERINEURAL at 12:25

## 2025-07-27 RX ADMIN — CLOSTRIDIUM TETANI TOXOID ANTIGEN (FORMALDEHYDE INACTIVATED), CORYNEBACTERIUM DIPHTHERIAE TOXOID ANTIGEN (FORMALDEHYDE INACTIVATED), BORDETELLA PERTUSSIS TOXOID ANTIGEN (GLUTARALDEHYDE INACTIVATED), BORDETELLA PERTUSSIS FILAMENTOUS HEMAGGLUTININ ANTIGEN (FORMALDEHYDE INACTIVATED), BORDETELLA PERTUSSIS PERTACTIN ANTIGEN, AND BORDETELLA PERTUSSIS FIMBRIAE 2/3 ANTIGEN 0.5 ML: 5; 2; 2.5; 5; 3; 5 INJECTION, SUSPENSION INTRAMUSCULAR at 12:26

## 2025-07-27 ASSESSMENT — ACTIVITIES OF DAILY LIVING (ADL): ADLS_ACUITY_SCORE: 41

## 2025-07-27 ASSESSMENT — COLUMBIA-SUICIDE SEVERITY RATING SCALE - C-SSRS
2. HAVE YOU ACTUALLY HAD ANY THOUGHTS OF KILLING YOURSELF IN THE PAST MONTH?: NO
6. HAVE YOU EVER DONE ANYTHING, STARTED TO DO ANYTHING, OR PREPARED TO DO ANYTHING TO END YOUR LIFE?: NO
1. IN THE PAST MONTH, HAVE YOU WISHED YOU WERE DEAD OR WISHED YOU COULD GO TO SLEEP AND NOT WAKE UP?: NO

## 2025-07-27 NOTE — DISCHARGE INSTRUCTIONS
It is very important that you take the Augmentin as prescribed.  This will reduce the risk of infection.  I have placed an urgent referral to see our local urology colleagues for further evaluation.

## 2025-07-27 NOTE — ED TRIAGE NOTES
Pt comes in with c/o dog bite to penis. Pt went to visit daughter when her rescue dog bit him, as he was unexpected. GCS 15.

## 2025-07-27 NOTE — ED PROVIDER NOTES
History     Chief Complaint   Patient presents with    Dog Bite     The history is provided by the patient.     Nehemias Miller is a 49 year old male who presented to the emergency department ambulatory along with police for evaluation of a dog bite.  The patient reports that he was visiting his daughter and the dog bit him in his penis.    Allergies:  No Known Allergies    Problem List:    Patient Active Problem List    Diagnosis Date Noted    ADHD (attention deficit hyperactivity disorder) 12/16/2019     Priority: Medium     IMO Update      Tobacco dependency 12/16/2019     Priority: Medium     IMO Update 10/11      Asthma 06/10/2002     Priority: Medium     IMO Update 10 2016          Past Medical History:    No past medical history on file.    Past Surgical History:    Past Surgical History:   Procedure Laterality Date    HC UROLOGY SURGERY PROCEDURE Left     testical torsion repair    HERNIA REPAIR Left     inguinal       Family History:    Family History   Problem Relation Age of Onset    Cancer Mother     Cancer Maternal Grandmother     Cancer Other        Social History:  Marital Status:  Single [1]  Social History     Tobacco Use    Smoking status: Every Day     Current packs/day: 3.00     Types: Cigarettes    Smokeless tobacco: Current   Substance Use Topics    Alcohol use: Not Currently     Comment: daily    Drug use: No        Medications:    amoxicillin-clavulanate (AUGMENTIN) 875-125 MG tablet  valACYclovir (VALTREX) 1000 mg tablet          Review of Systems   Genitourinary:         See HPI       Physical Exam   BP: (!) 154/118  Pulse: 118  Temp: 98.3  F (36.8  C)  Resp: 18  SpO2: 95 %      Physical Exam  Vitals and nursing note reviewed.   Constitutional:       Appearance: Normal appearance. He is normal weight.   Pulmonary:      Effort: Pulmonary effort is normal.   Genitourinary:     Comments: Examination reveals a large avulsion type laceration on the dorsal aspect as well as throughout the entire  right side of the distal penis just proximal to the glans.  There appears to be no significant violation into the deep fascia or tunica albuginea.  There does appear to be loss of tissue from one of the areas.There was a small laceration to the glans on the dorsal aspect  Musculoskeletal:      Comments: A few areas on the right arm and right thigh that appear like small abrasions and from the dog and one puncture.    Skin:     General: Skin is warm and dry.      Capillary Refill: Capillary refill takes less than 2 seconds.   Neurological:      General: No focal deficit present.      Mental Status: He is alert and oriented to person, place, and time.         ED Course        Procedures              Critical Care time:  none     None         No results found for this or any previous visit (from the past 24 hours).    Medications   lidocaine injection 2% (has no administration in time range)   Tdap (tetanus-diphtheria-acell pertussis) (ADACEL) injection 0.5 mL (has no administration in time range)       Assessments & Plan (with Medical Decision Making)   This is a 49-year-old male who presented to the emergency department ambulatory along with police for evaluation of a dog bite to his penis.  The defect involved the dorsal aspect and the right side of the penis just proximal to the glans.  There appeared to be an area of loss of tissue at the most lateral spot.  There does not appear to be any significant damage into the deeper fascia or tunica albuginea.  There was 1 small defect at the dorsal aspect of the glans.  I had a long detailed discussion with the patient and discussed the importance of transfer for further urologic evaluation or plastic surgery.  The patient absolutely refused and told me he would leave the emergency department without care.  He has the capacity to make his own decisions.  The penis was then anesthetized with 2% lidocaine without epinephrine by way of a penile block.  The area was copiously  cleaned with normal saline and tap water.  The lacerations were approximated with approximately 15 simple interrupted sutures with a combination of fast-absorbing chromic and a 5-0 Vicryl.  The injury did not involve the scrotum or the urethra.  I had a long and exhaustive conversation with the patient regarding the importance of antibiotics and tetanus shot.  Unfortunately he refused the antibiotics.  He did allow us to give him a tetanus booster.  I placed an emergent referral to our local urologist for further evaluation.  I stressed the utmost importance that you return to this emergency department for any concerns or changes whatsoever.    As stated above, the patient was advised of the possible defects and poor outcome from primary closure in the emergency department several times.  He voiced complete understanding and absolutely refused any discussion of transfer or specialty follow-up.    This document was prepared using a combination of typing and voice generated software.  While every attempt was made for accuracy, spelling and grammatical errors may exist.     I have reviewed the nursing notes.    I have reviewed the findings, diagnosis, plan and need for follow up with the patient.           Medical Decision Making  The patient's presentation was of moderate complexity (an acute complicated injury).    The patient's evaluation involved:  history and exam without other MDM data elements    The patient's management necessitated moderate risk (a decision regarding minor procedure (laceration repair) with risk factors of none).        New Prescriptions    AMOXICILLIN-CLAVULANATE (AUGMENTIN) 875-125 MG TABLET    Take 1 tablet by mouth 2 times daily.       Final diagnoses:   Dog bite, initial encounter       7/27/2025   [facility]       Bk Gaona PA-C  07/27/25 1243

## 2025-07-27 NOTE — ED NOTES
Pt received stitching on bite wound by PA. Tetanus shot given. When I attempted to dress wound with ABX ointment and gauze, pt declined. Pt unable to sit still in room to wait for AVS. Attempted to give AVS and ABX script to pt before he left, in which he declined. Educated pt as much as possible, PA updated.

## 2025-07-29 ENCOUNTER — OFFICE VISIT (OUTPATIENT)
Dept: UROLOGY | Facility: OTHER | Age: 50
End: 2025-07-29
Attending: UROLOGY
Payer: MEDICAID

## 2025-07-29 ENCOUNTER — HOSPITAL ENCOUNTER (EMERGENCY)
Facility: HOSPITAL | Age: 50
Discharge: HOME | End: 2025-07-29
Attending: EMERGENCY MEDICINE
Payer: MEDICAID

## 2025-07-29 VITALS — OXYGEN SATURATION: 98 % | SYSTOLIC BLOOD PRESSURE: 126 MMHG | HEART RATE: 110 BPM | DIASTOLIC BLOOD PRESSURE: 86 MMHG

## 2025-07-29 VITALS
SYSTOLIC BLOOD PRESSURE: 139 MMHG | TEMPERATURE: 98.2 F | RESPIRATION RATE: 18 BRPM | DIASTOLIC BLOOD PRESSURE: 77 MMHG | OXYGEN SATURATION: 97 % | HEART RATE: 91 BPM
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
## 2025-07-29 DIAGNOSIS — L03.113 CELLULITIS OF RIGHT HAND: ICD-10-CM
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
## 2025-07-29 LAB
ALBUMIN SERPL BCG-MCNC: 4.2 G/DL (ref 3.5–5.2)
ALP SERPL-CCNC: 66 U/L (ref 40–150)
ALT SERPL W P-5'-P-CCNC: 21 U/L (ref 0–70)
ANION GAP SERPL CALCULATED.3IONS-SCNC: 13 MMOL/L (ref 7–15)
AST SERPL W P-5'-P-CCNC: 35 U/L (ref 0–45)
BASOPHILS # BLD AUTO: 0.1 10E3/UL (ref 0–0.2)
BASOPHILS NFR BLD AUTO: 1 %
BILIRUB SERPL-MCNC: 0.6 MG/DL
BUN SERPL-MCNC: 7.4 MG/DL (ref 6–20)
CALCIUM SERPL-MCNC: 8.7 MG/DL (ref 8.8–10.4)
CHLORIDE SERPL-SCNC: 102 MMOL/L (ref 98–107)
CREAT SERPL-MCNC: 0.9 MG/DL (ref 0.67–1.17)
CRP SERPL-MCNC: 49.19 MG/L
EGFRCR SERPLBLD CKD-EPI 2021: >90 ML/MIN/1.73M2
EOSINOPHIL # BLD AUTO: 0.1 10E3/UL (ref 0–0.7)
EOSINOPHIL NFR BLD AUTO: 1 %
ERYTHROCYTE [DISTWIDTH] IN BLOOD BY AUTOMATED COUNT: 12.5 % (ref 10–15)
GLUCOSE SERPL-MCNC: 99 MG/DL (ref 70–99)
HCO3 SERPL-SCNC: 23 MMOL/L (ref 22–29)
HCT VFR BLD AUTO: 43.2 % (ref 40–53)
HGB BLD-MCNC: 15.5 G/DL (ref 13.3–17.7)
HOLD SPECIMEN: NORMAL
IMM GRANULOCYTES # BLD: 0 10E3/UL
IMM GRANULOCYTES NFR BLD: 0 %
LYMPHOCYTES # BLD AUTO: 1.4 10E3/UL (ref 0.8–5.3)
LYMPHOCYTES NFR BLD AUTO: 20 %
MCH RBC QN AUTO: 32.3 PG (ref 26.5–33)
MCHC RBC AUTO-ENTMCNC: 35.9 G/DL (ref 31.5–36.5)
MCV RBC AUTO: 90 FL (ref 78–100)
MONOCYTES # BLD AUTO: 0.6 10E3/UL (ref 0–1.3)
MONOCYTES NFR BLD AUTO: 9 %
NEUTROPHILS # BLD AUTO: 4.7 10E3/UL (ref 1.6–8.3)
NEUTROPHILS NFR BLD AUTO: 68 %
NRBC # BLD AUTO: 0 10E3/UL
NRBC BLD AUTO-RTO: 0 /100
PLATELET # BLD AUTO: 209 10E3/UL (ref 150–450)
POTASSIUM SERPL-SCNC: 3.8 MMOL/L (ref 3.4–5.3)
PROT SERPL-MCNC: 6.8 G/DL (ref 6.4–8.3)
RBC # BLD AUTO: 4.8 10E6/UL (ref 4.4–5.9)
SODIUM SERPL-SCNC: 138 MMOL/L (ref 135–145)
WBC # BLD AUTO: 6.9 10E3/UL (ref 4–11)

## 2025-07-29 PROCEDURE — 85025 COMPLETE CBC W/AUTO DIFF WBC: CPT | Performed by: EMERGENCY MEDICINE

## 2025-07-29 PROCEDURE — 36415 COLL VENOUS BLD VENIPUNCTURE: CPT | Performed by: EMERGENCY MEDICINE

## 2025-07-29 PROCEDURE — 82565 ASSAY OF CREATININE: CPT | Performed by: EMERGENCY MEDICINE

## 2025-07-29 PROCEDURE — 99284 EMERGENCY DEPT VISIT MOD MDM: CPT | Mod: 25 | Performed by: EMERGENCY MEDICINE

## 2025-07-29 PROCEDURE — G0463 HOSPITAL OUTPT CLINIC VISIT: HCPCS

## 2025-07-29 PROCEDURE — 82947 ASSAY GLUCOSE BLOOD QUANT: CPT | Performed by: EMERGENCY MEDICINE

## 2025-07-29 PROCEDURE — G0463 HOSPITAL OUTPT CLINIC VISIT: HCPCS | Mod: 25

## 2025-07-29 PROCEDURE — 250N000011 HC RX IP 250 OP 636: Performed by: EMERGENCY MEDICINE

## 2025-07-29 PROCEDURE — 99284 EMERGENCY DEPT VISIT MOD MDM: CPT | Performed by: EMERGENCY MEDICINE

## 2025-07-29 PROCEDURE — 96365 THER/PROPH/DIAG IV INF INIT: CPT | Performed by: EMERGENCY MEDICINE

## 2025-07-29 PROCEDURE — 86140 C-REACTIVE PROTEIN: CPT | Performed by: EMERGENCY MEDICINE

## 2025-07-29 RX ORDER — AMPICILLIN AND SULBACTAM 2; 1 G/1; G/1
3 INJECTION, POWDER, FOR SOLUTION INTRAMUSCULAR; INTRAVENOUS ONCE
Status: COMPLETED | OUTPATIENT
Start: 2025-07-29 | End: 2025-07-29

## 2025-07-29 RX ADMIN — AMPICILLIN SODIUM AND SULBACTAM SODIUM 3 G: 2; 1 INJECTION, POWDER, FOR SOLUTION INTRAMUSCULAR; INTRAVENOUS at 15:51

## 2025-07-29 ASSESSMENT — COLUMBIA-SUICIDE SEVERITY RATING SCALE - C-SSRS
2. HAVE YOU ACTUALLY HAD ANY THOUGHTS OF KILLING YOURSELF IN THE PAST MONTH?: NO
1. IN THE PAST MONTH, HAVE YOU WISHED YOU WERE DEAD OR WISHED YOU COULD GO TO SLEEP AND NOT WAKE UP?: NO
6. HAVE YOU EVER DONE ANYTHING, STARTED TO DO ANYTHING, OR PREPARED TO DO ANYTHING TO END YOUR LIFE?: NO

## 2025-07-29 ASSESSMENT — ACTIVITIES OF DAILY LIVING (ADL)
ADLS_ACUITY_SCORE: 41

## 2025-07-29 ASSESSMENT — PAIN SCALES - GENERAL: PAINLEVEL_OUTOF10: SEVERE PAIN (8)

## 2025-07-29 NOTE — PROGRESS NOTES
HPI:    Nehemias Miller is a 49 year old gentleman seen today for evaluation of penile dog bite.  He is seen at the request of Mary Leslie.    He was bitten by a dog 2 days ago. He was evaluated and treated in the ER. He started antibiotics this morning.    He sustained a bite on the right side of the phallus. This was sutured by the ER staff. He states he has no penile sensation. There has not been any discharge from that wound.    He also notes that the swelling in his right hand and the erythema spreading up his right arm have increased markedly since this morning.      ROS:   10 point review of systems performed.  Pertinent positives and negatives in HPI.    No past medical history on file.    Past Surgical History:   Procedure Laterality Date    HC UROLOGY SURGERY PROCEDURE Left     testical torsion repair    HERNIA REPAIR Left     inguinal       Family History   Problem Relation Age of Onset    Cancer Mother     Cancer Maternal Grandmother     Cancer Other         Social History     Tobacco Use    Smoking status: Every Day     Current packs/day: 3.00     Types: Cigarettes    Smokeless tobacco: Current    Tobacco comments:     Refused quit plan 7/29/2025   Substance Use Topics    Alcohol use: Not Currently     Comment: daily    Drug use: No        Current Outpatient Medications   Medication Sig Dispense Refill    amoxicillin-clavulanate (AUGMENTIN) 875-125 MG tablet Take 1 tablet by mouth 2 times daily for 7 days. 14 tablet 0    valACYclovir (VALTREX) 1000 mg tablet Take 1 tablet (1,000 mg) by mouth 3 times daily for 7 days 21 tablet 0    amoxicillin-clavulanate (AUGMENTIN) 875-125 MG tablet Take 1 tablet by mouth 2 times daily. (Patient not taking: Reported on 7/29/2025) 20 tablet 0     No current facility-administered medications for this visit.       Exam:  /86 (BP Location: Left arm, Patient Position: Sitting, Cuff Size: Adult Regular)   Pulse 110   SpO2 98%   Gen: Pleasant, NAD  HEENT:  WNL  Resp: nonlabored on RA  Phallus is ecchymotic from glans to base. Edematous penile skin. Glans is edematous. Laceration at right coronal margin is closed with suture.  Right hand swollen and erythematous above the wrist.      Assessment and Plan:    Nehemias Miller is a 49 year old gentleman seen today for the following:    Dog bite, initial encounter     His acute urologic issue has been treated. He has a potentially dangerous acute health problem with worsening signs of infection in his right hand and wrist. He was counseled that his penile wound will likely heal. He was instructed to go straight to the ER from the conclusion of this visit for additional evaluation of his hand infection.

## 2025-07-29 NOTE — ED TRIAGE NOTES
Patient presents with c/o being bit by a dog on Sunday. Now having redness and swelling traveling up right arm, was bit in right hand.

## 2025-07-29 NOTE — ED PROVIDER NOTES
History     Chief Complaint   Patient presents with    Wound Check    Dog Bite     HPI  Nehemias Miller is a 49 year old male who presents to the ED with wanting wound assessment of right hand.  Patient was seen here for a dog bite, known husky that bit him on his right nondominant hand, buttock genitals and leg.  He was prescribed Augmentin see EMR for details he took his first dose this morning.  He went to [place] ED was seen see their note he had a x-ray of his hand that showed an old fifth metacarpal fracture no acute findings or gas seen in the joint.  He was seen by urology this morning for his genital wounds and are healing well but was advised to come to the ED for IV antibiotics for his right hand is become painful swollen and red.  Had some streaking up his arm.  He has not had any fevers and is otherwise well.  Patient's had a tetanus booster, the dog was up-to-date with rabies confirmed by [facility] department    Allergies:  No Known Allergies    Problem List:    Patient Active Problem List    Diagnosis Date Noted    ADHD (attention deficit hyperactivity disorder) 12/16/2019     Priority: Medium     IMO Update      Tobacco dependency 12/16/2019     Priority: Medium     IMO Update 10/11      Asthma 06/10/2002     Priority: Medium     IMO Update 10 2016          Past Medical History:    History reviewed. No pertinent past medical history.    Past Surgical History:    Past Surgical History:   Procedure Laterality Date    HC UROLOGY SURGERY PROCEDURE Left     testical torsion repair    HERNIA REPAIR Left     inguinal       Family History:    Family History   Problem Relation Age of Onset    Cancer Mother     Cancer Maternal Grandmother     Cancer Other        Social History:  Marital Status:  Single [1]  Social History     Tobacco Use    Smoking status: Every Day     Current packs/day: 3.00     Types: Cigarettes    Smokeless tobacco: Current    Tobacco comments:     Refused quit plan 7/29/2025   Substance  Use Topics    Alcohol use: Not Currently     Comment: daily    Drug use: No        Medications:    amoxicillin-clavulanate (AUGMENTIN) 875-125 MG tablet  valACYclovir (VALTREX) 1000 mg tablet          Review of Systems   All other systems reviewed and are negative.      Physical Exam   BP: 139/77  Pulse: 91  Temp: 98.1  F (36.7  C)  Resp: 18  SpO2: 97 %      Physical Exam  Vitals and nursing note reviewed.   Constitutional:       General: He is not in acute distress.     Appearance: Normal appearance. He is not ill-appearing, toxic-appearing or diaphoretic.   HENT:      Head: Normocephalic and atraumatic.      Right Ear: External ear normal.      Left Ear: External ear normal.      Nose: Nose normal.      Mouth/Throat:      Mouth: Mucous membranes are moist.      Pharynx: Oropharynx is clear.   Eyes:      General: No scleral icterus.     Extraocular Movements: Extraocular movements intact.      Conjunctiva/sclera: Conjunctivae normal.   Cardiovascular:      Rate and Rhythm: Normal rate and regular rhythm.      Pulses: Normal pulses.      Heart sounds: Normal heart sounds.   Pulmonary:      Effort: Pulmonary effort is normal. No respiratory distress.      Breath sounds: Normal breath sounds.   Abdominal:      Palpations: Abdomen is soft.      Tenderness: There is no abdominal tenderness.   Musculoskeletal:         General: No deformity. Normal range of motion.      Cervical back: Normal range of motion and neck supple.      Right lower leg: No edema.      Left lower leg: No edema.   Skin:     General: Skin is warm and dry.      Capillary Refill: Capillary refill takes less than 2 seconds.   Neurological:      General: No focal deficit present.      Mental Status: He is alert and oriented to person, place, and time. Mental status is at baseline.      Comments: Complaining of some numbness in her right hand mainly dorsum   Psychiatric:         Mood and Affect: Mood normal.         Behavior: Behavior normal.          Thought Content: Thought content normal.         Judgment: Judgment normal.         ED Course        Procedures              Critical Care time:  none     IV Antibiotics given and/or elevated Lactate of 0 and no sepsis note found - Delete this reminder and enter the sepsis note or '.edcms' before signing chart.>>>None         Recent Results (from the past 24 hours)   CBC with Platelets and Differential (Limited Occurrences)    Narrative    The following orders were created for panel order CBC with Platelets and Differential (Limited Occurrences).  Procedure                               Abnormality         Status                     ---------                               -----------         ------                     CBC with platelets and ...[[id number]]                      Final result                 Please view results for these tests on the individual orders.   Comprehensive Metabolic Panel (Limited Occurrences)   Result Value Ref Range    Sodium 138 135 - 145 mmol/L    Potassium 3.8 3.4 - 5.3 mmol/L    Carbon Dioxide (CO2) 23 22 - 29 mmol/L    Anion Gap 13 7 - 15 mmol/L    Urea Nitrogen 7.4 6.0 - 20.0 mg/dL    Creatinine 0.90 0.67 - 1.17 mg/dL    GFR Estimate >90 >60 mL/min/1.73m2    Calcium 8.7 (L) 8.8 - 10.4 mg/dL    Chloride 102 98 - 107 mmol/L    Glucose 99 70 - 99 mg/dL    Alkaline Phosphatase 66 40 - 150 U/L    AST 35 0 - 45 U/L    ALT 21 0 - 70 U/L    Protein Total 6.8 6.4 - 8.3 g/dL    Albumin 4.2 3.5 - 5.2 g/dL    Bilirubin Total 0.6 <=1.2 mg/dL   CRP inflammation   Result Value Ref Range    CRP Inflammation 49.19 (H) <5.00 mg/L   CBC with platelets and differential   Result Value Ref Range    WBC Count 6.9 4.0 - 11.0 10e3/uL    RBC Count 4.80 4.40 - 5.90 10e6/uL    Hemoglobin 15.5 13.3 - 17.7 g/dL    Hematocrit 43.2 40.0 - 53.0 %    MCV 90 78 - 100 fL    MCH 32.3 26.5 - 33.0 pg    MCHC 35.9 31.5 - 36.5 g/dL    RDW 12.5 10.0 - 15.0 %    Platelet Count 209 150 - 450 10e3/uL    % Neutrophils 68 %    %  Lymphocytes 20 %    % Monocytes 9 %    % Eosinophils 1 %    % Basophils 1 %    % Immature Granulocytes 0 %    NRBCs per 100 WBC 0 <1 /100    Absolute Neutrophils 4.7 1.6 - 8.3 10e3/uL    Absolute Lymphocytes 1.4 0.8 - 5.3 10e3/uL    Absolute Monocytes 0.6 0.0 - 1.3 10e3/uL    Absolute Eosinophils 0.1 0.0 - 0.7 10e3/uL    Absolute Basophils 0.1 0.0 - 0.2 10e3/uL    Absolute Immature Granulocytes 0.0 <=0.4 10e3/uL    Absolute NRBCs 0.0 10e3/uL   Extra Tube    Narrative    The following orders were created for panel order Extra Tube.  Procedure                               Abnormality         Status                     ---------                               -----------         ------                     Extra Blue Top Tube[[id number]]                             Final result               Extra Red Top Tube[[id number]]                              Final result               Extra Green Top (Lithiu...[[id number]]                      Final result                 Please view results for these tests on the individual orders.   Extra Blue Top Tube   Result Value Ref Range    Hold Specimen JIC    Extra Red Top Tube   Result Value Ref Range    Hold Specimen JIC    Extra Green Top (Lithium Heparin) Tube   Result Value Ref Range    Hold Specimen JIC        Medications   ampicillin-sulbactam (UNASYN) 3 g vial to attach to  mL bag (0 g Intravenous Stopped 7/29/25 1623)       Assessments & Plan (with Medical Decision Making)     I have reviewed the nursing notes.    I have reviewed the findings, diagnosis, plan and need for follow up with the patient.           Medical Decision Making  The patient's presentation was of moderate complexity (an acute complicated injury).    The patient's evaluation involved:  history and exam without other MDM data elements, multiple lab investigations and reviewed x-ray from yesterday done in [place] ED    The patient's management necessitated moderate risk (prescription drug management  including medications given in the ED).    Patient appears to have simple cellulitis no signs of abscess or infective tenosynovitis clinically advised to continue antibiotics until they are gone return if signs or symptoms of increasing infection develop patient is comfortable with plan at rest hand until better return to ED if condition deteriorates strong return precautions given    New Prescriptions    No medications on file       Final diagnoses:   Dog bite, initial encounter   Cellulitis of right hand       7/29/2025   [facility]       Ruddy Andino MD  07/29/25 0639

## 2025-07-29 NOTE — ED NOTES
Patient in room 5, settled on cot with call light with in reach.    Patient was bit by a dog on Sunday. Did not let staff wrap it and did not  his Rx. States he went to [place] ED yesterday and they told him he needed to be on the Abx and re ordered it. He states he then woke up this am and his hand was much more swollen and red so he went and picked up the Abx. He went to urologist today to have his bit in his genital area checked today and that was fine but he was told to come to the ED regarding his hand wound.

## 2025-08-27 ENCOUNTER — OFFICE VISIT (OUTPATIENT)
Dept: FAMILY MEDICINE | Facility: OTHER | Age: 50
End: 2025-08-27
Attending: NURSE PRACTITIONER
Payer: COMMERCIAL

## 2025-08-27 VITALS
HEART RATE: 84 BPM | OXYGEN SATURATION: 95 % | SYSTOLIC BLOOD PRESSURE: 132 MMHG | RESPIRATION RATE: 18 BRPM | TEMPERATURE: 98.6 F | DIASTOLIC BLOOD PRESSURE: 86 MMHG

## 2025-08-27 DIAGNOSIS — Z00.00 ROUTINE GENERAL MEDICAL EXAMINATION AT A HEALTH CARE FACILITY: Primary | ICD-10-CM

## 2025-08-27 DIAGNOSIS — Z12.5 PROSTATE CANCER SCREENING: ICD-10-CM

## 2025-08-27 DIAGNOSIS — N52.9 ERECTILE DYSFUNCTION, UNSPECIFIED ERECTILE DYSFUNCTION TYPE: ICD-10-CM

## 2025-08-27 DIAGNOSIS — Z12.11 SCREEN FOR COLON CANCER: ICD-10-CM

## 2025-08-27 DIAGNOSIS — R39.11 URINARY HESITANCY: ICD-10-CM

## 2025-08-27 DIAGNOSIS — K40.90 NON-RECURRENT UNILATERAL INGUINAL HERNIA WITHOUT OBSTRUCTION OR GANGRENE: ICD-10-CM

## 2025-08-27 DIAGNOSIS — Z13.6 CARDIOVASCULAR SCREENING; LDL GOAL LESS THAN 100: ICD-10-CM

## 2025-08-27 LAB
ALBUMIN UR-MCNC: NEGATIVE MG/DL
APPEARANCE UR: CLEAR
BILIRUB UR QL STRIP: NEGATIVE
CHOLEST SERPL-MCNC: 160 MG/DL
COLOR UR AUTO: YELLOW
FASTING STATUS PATIENT QL REPORTED: YES
GLUCOSE UR STRIP-MCNC: NEGATIVE MG/DL
HDLC SERPL-MCNC: 69 MG/DL
HGB UR QL STRIP: NEGATIVE
HOLD SPECIMEN: NORMAL
KETONES UR STRIP-MCNC: NEGATIVE MG/DL
LDLC SERPL CALC-MCNC: 75 MG/DL
LEUKOCYTE ESTERASE UR QL STRIP: NEGATIVE
NITRATE UR QL: NEGATIVE
NONHDLC SERPL-MCNC: 91 MG/DL
PH UR STRIP: 7.5 [PH] (ref 5–7)
PSA SERPL DL<=0.01 NG/ML-MCNC: 1.2 NG/ML (ref 0–2.5)
SP GR UR STRIP: 1.01 (ref 1–1.03)
TRIGL SERPL-MCNC: 78 MG/DL
TSH SERPL DL<=0.005 MIU/L-ACNC: 0.64 UIU/ML (ref 0.3–4.2)
UROBILINOGEN UR STRIP-ACNC: 0.2 E.U./DL

## 2025-08-27 RX ORDER — TADALAFIL 5 MG/1
5 TABLET ORAL DAILY
Qty: 90 TABLET | Refills: 1 | Status: SHIPPED | OUTPATIENT
Start: 2025-08-27 | End: 2025-08-28

## 2025-08-27 RX ORDER — TAMSULOSIN HYDROCHLORIDE 0.4 MG/1
0.4 CAPSULE ORAL DAILY
Qty: 90 CAPSULE | Refills: 1 | Status: SHIPPED | OUTPATIENT
Start: 2025-08-27

## 2025-08-27 SDOH — HEALTH STABILITY: PHYSICAL HEALTH: ON AVERAGE, HOW MANY DAYS PER WEEK DO YOU ENGAGE IN MODERATE TO STRENUOUS EXERCISE (LIKE A BRISK WALK)?: 4 DAYS

## 2025-08-27 SDOH — HEALTH STABILITY: PHYSICAL HEALTH: ON AVERAGE, HOW MANY MINUTES DO YOU ENGAGE IN EXERCISE AT THIS LEVEL?: 70 MIN

## 2025-08-27 ASSESSMENT — ASTHMA QUESTIONNAIRES
QUESTION_2 LAST FOUR WEEKS HOW OFTEN HAVE YOU HAD SHORTNESS OF BREATH: NOT AT ALL
QUESTION_1 LAST FOUR WEEKS HOW MUCH OF THE TIME DID YOUR ASTHMA KEEP YOU FROM GETTING AS MUCH DONE AT WORK, SCHOOL OR AT HOME: NONE OF THE TIME
QUESTION_4 LAST FOUR WEEKS HOW OFTEN HAVE YOU USED YOUR RESCUE INHALER OR NEBULIZER MEDICATION (SUCH AS ALBUTEROL): NOT AT ALL
QUESTION_5 LAST FOUR WEEKS HOW WOULD YOU RATE YOUR ASTHMA CONTROL: COMPLETELY CONTROLLED
QUESTION_3 LAST FOUR WEEKS HOW OFTEN DID YOUR ASTHMA SYMPTOMS (WHEEZING, COUGHING, SHORTNESS OF BREATH, CHEST TIGHTNESS OR PAIN) WAKE YOU UP AT NIGHT OR EARLIER THAN USUAL IN THE MORNING: ONCE OR TWICE
ACT_TOTALSCORE: 24

## 2025-08-27 ASSESSMENT — PAIN SCALES - GENERAL: PAINLEVEL_OUTOF10: NO PAIN (0)

## 2025-08-28 ENCOUNTER — TELEPHONE (OUTPATIENT)
Dept: FAMILY MEDICINE | Facility: OTHER | Age: 50
End: 2025-08-28

## 2025-08-28 DIAGNOSIS — N52.9 ERECTILE DYSFUNCTION, UNSPECIFIED ERECTILE DYSFUNCTION TYPE: ICD-10-CM

## 2025-08-28 RX ORDER — TADALAFIL 5 MG/1
5 TABLET ORAL DAILY
Qty: 90 TABLET | Refills: 1 | Status: SHIPPED | OUTPATIENT
Start: 2025-08-28